# Patient Record
Sex: MALE | Race: OTHER | Employment: UNEMPLOYED | URBAN - METROPOLITAN AREA
[De-identification: names, ages, dates, MRNs, and addresses within clinical notes are randomized per-mention and may not be internally consistent; named-entity substitution may affect disease eponyms.]

---

## 2022-10-16 PROCEDURE — 96366 THER/PROPH/DIAG IV INF ADDON: CPT

## 2022-10-16 PROCEDURE — 96365 THER/PROPH/DIAG IV INF INIT: CPT

## 2022-10-16 PROCEDURE — 99285 EMERGENCY DEPT VISIT HI MDM: CPT

## 2022-10-16 PROCEDURE — 96368 THER/DIAG CONCURRENT INF: CPT

## 2022-10-16 PROCEDURE — 96376 TX/PRO/DX INJ SAME DRUG ADON: CPT

## 2022-10-16 PROCEDURE — 0J9J0ZZ DRAINAGE OF RIGHT HAND SUBCUTANEOUS TISSUE AND FASCIA, OPEN APPROACH: ICD-10-PCS | Performed by: ORTHOPAEDIC SURGERY

## 2022-10-16 RX ORDER — HYDROCODONE BITARTRATE AND ACETAMINOPHEN 7.5; 325 MG/1; MG/1
1 TABLET ORAL ONCE
Status: COMPLETED | OUTPATIENT
Start: 2022-10-17 | End: 2022-10-17

## 2022-10-17 ENCOUNTER — ANESTHESIA EVENT (OUTPATIENT)
Dept: SURGERY | Age: 22
DRG: 603 | End: 2022-10-17

## 2022-10-17 ENCOUNTER — ANESTHESIA (OUTPATIENT)
Dept: SURGERY | Age: 22
DRG: 603 | End: 2022-10-17

## 2022-10-17 ENCOUNTER — APPOINTMENT (OUTPATIENT)
Dept: GENERAL RADIOLOGY | Age: 22
DRG: 603 | End: 2022-10-17
Attending: STUDENT IN AN ORGANIZED HEALTH CARE EDUCATION/TRAINING PROGRAM

## 2022-10-17 ENCOUNTER — HOSPITAL ENCOUNTER (INPATIENT)
Age: 22
LOS: 1 days | Discharge: HOME | DRG: 603 | End: 2022-10-19
Attending: EMERGENCY MEDICINE | Admitting: HOSPITALIST

## 2022-10-17 DIAGNOSIS — M65.9 TENOSYNOVITIS: ICD-10-CM

## 2022-10-17 DIAGNOSIS — L03.011 CELLULITIS OF FINGER OF RIGHT HAND: Primary | ICD-10-CM

## 2022-10-17 LAB
ALBUMIN SERPL-MCNC: 3.8 G/DL (ref 3.5–5)
ALBUMIN/GLOB SERPL: 1 {RATIO} (ref 1.1–2.2)
ALP SERPL-CCNC: 61 U/L (ref 45–117)
ALT SERPL-CCNC: 17 U/L (ref 12–78)
ANION GAP SERPL CALC-SCNC: 5 MMOL/L (ref 5–15)
AST SERPL-CCNC: 3 U/L (ref 15–37)
BASOPHILS # BLD: 0 K/UL (ref 0–0.1)
BASOPHILS NFR BLD: 0 % (ref 0–1)
BILIRUB SERPL-MCNC: 0.5 MG/DL (ref 0.2–1)
BUN SERPL-MCNC: 17 MG/DL (ref 6–20)
BUN/CREAT SERPL: 21 (ref 12–20)
CALCIUM SERPL-MCNC: 9.2 MG/DL (ref 8.5–10.1)
CHLORIDE SERPL-SCNC: 103 MMOL/L (ref 97–108)
CO2 SERPL-SCNC: 28 MMOL/L (ref 21–32)
COMMENT, HOLDF: NORMAL
CREAT SERPL-MCNC: 0.82 MG/DL (ref 0.7–1.3)
DIFFERENTIAL METHOD BLD: ABNORMAL
EOSINOPHIL # BLD: 0.1 K/UL (ref 0–0.4)
EOSINOPHIL NFR BLD: 1 % (ref 0–7)
ERYTHROCYTE [DISTWIDTH] IN BLOOD BY AUTOMATED COUNT: 12.1 % (ref 11.5–14.5)
EST. AVERAGE GLUCOSE BLD GHB EST-MCNC: 105 MG/DL
GLOBULIN SER CALC-MCNC: 4 G/DL (ref 2–4)
GLUCOSE SERPL-MCNC: 119 MG/DL (ref 65–100)
HBA1C MFR BLD: 5.3 % (ref 4–5.6)
HCT VFR BLD AUTO: 41.3 % (ref 36.6–50.3)
HGB BLD-MCNC: 14.5 G/DL (ref 12.1–17)
IMM GRANULOCYTES # BLD AUTO: 0.1 K/UL (ref 0–0.04)
IMM GRANULOCYTES NFR BLD AUTO: 0 % (ref 0–0.5)
LACTATE BLD-SCNC: 1.04 MMOL/L (ref 0.4–2)
LYMPHOCYTES # BLD: 3 K/UL (ref 0.8–3.5)
LYMPHOCYTES NFR BLD: 20 % (ref 12–49)
MCH RBC QN AUTO: 31.5 PG (ref 26–34)
MCHC RBC AUTO-ENTMCNC: 35.1 G/DL (ref 30–36.5)
MCV RBC AUTO: 89.6 FL (ref 80–99)
MONOCYTES # BLD: 1.2 K/UL (ref 0–1)
MONOCYTES NFR BLD: 8 % (ref 5–13)
NEUTS SEG # BLD: 11 K/UL (ref 1.8–8)
NEUTS SEG NFR BLD: 71 % (ref 32–75)
NRBC # BLD: 0 K/UL (ref 0–0.01)
NRBC BLD-RTO: 0 PER 100 WBC
PLATELET # BLD AUTO: 345 K/UL (ref 150–400)
PMV BLD AUTO: 9.4 FL (ref 8.9–12.9)
POTASSIUM SERPL-SCNC: 4.2 MMOL/L (ref 3.5–5.1)
PROT SERPL-MCNC: 7.8 G/DL (ref 6.4–8.2)
RBC # BLD AUTO: 4.61 M/UL (ref 4.1–5.7)
SAMPLES BEING HELD,HOLD: NORMAL
SODIUM SERPL-SCNC: 136 MMOL/L (ref 136–145)
WBC # BLD AUTO: 15.5 K/UL (ref 4.1–11.1)

## 2022-10-17 PROCEDURE — 36415 COLL VENOUS BLD VENIPUNCTURE: CPT

## 2022-10-17 PROCEDURE — 83036 HEMOGLOBIN GLYCOSYLATED A1C: CPT

## 2022-10-17 PROCEDURE — 87205 SMEAR GRAM STAIN: CPT

## 2022-10-17 PROCEDURE — 85025 COMPLETE CBC W/AUTO DIFF WBC: CPT

## 2022-10-17 PROCEDURE — 96366 THER/PROPH/DIAG IV INF ADDON: CPT

## 2022-10-17 PROCEDURE — 73140 X-RAY EXAM OF FINGER(S): CPT

## 2022-10-17 PROCEDURE — 2709999900 HC NON-CHARGEABLE SUPPLY: Performed by: ORTHOPAEDIC SURGERY

## 2022-10-17 PROCEDURE — 74011000250 HC RX REV CODE- 250: Performed by: ORTHOPAEDIC SURGERY

## 2022-10-17 PROCEDURE — 77030020143 HC AIRWY LARYN INTUB CGAS -A: Performed by: ANESTHESIOLOGY

## 2022-10-17 PROCEDURE — 74011250637 HC RX REV CODE- 250/637: Performed by: STUDENT IN AN ORGANIZED HEALTH CARE EDUCATION/TRAINING PROGRAM

## 2022-10-17 PROCEDURE — 77030040361 HC SLV COMPR DVT MDII -B: Performed by: ORTHOPAEDIC SURGERY

## 2022-10-17 PROCEDURE — G0378 HOSPITAL OBSERVATION PER HR: HCPCS

## 2022-10-17 PROCEDURE — 76010000149 HC OR TIME 1 TO 1.5 HR: Performed by: ORTHOPAEDIC SURGERY

## 2022-10-17 PROCEDURE — 76210000006 HC OR PH I REC 0.5 TO 1 HR: Performed by: ORTHOPAEDIC SURGERY

## 2022-10-17 PROCEDURE — 96376 TX/PRO/DX INJ SAME DRUG ADON: CPT

## 2022-10-17 PROCEDURE — 26020 DRAIN HAND TENDON SHEATH: CPT | Performed by: ORTHOPAEDIC SURGERY

## 2022-10-17 PROCEDURE — 74011250636 HC RX REV CODE- 250/636: Performed by: NURSE ANESTHETIST, CERTIFIED REGISTERED

## 2022-10-17 PROCEDURE — 77030040503 HC DRN WND PENRS MDII -A: Performed by: ORTHOPAEDIC SURGERY

## 2022-10-17 PROCEDURE — 74011000250 HC RX REV CODE- 250: Performed by: STUDENT IN AN ORGANIZED HEALTH CARE EDUCATION/TRAINING PROGRAM

## 2022-10-17 PROCEDURE — 74011000250 HC RX REV CODE- 250: Performed by: PHYSICIAN ASSISTANT

## 2022-10-17 PROCEDURE — 74011000258 HC RX REV CODE- 258: Performed by: STUDENT IN AN ORGANIZED HEALTH CARE EDUCATION/TRAINING PROGRAM

## 2022-10-17 PROCEDURE — 87077 CULTURE AEROBIC IDENTIFY: CPT

## 2022-10-17 PROCEDURE — 96368 THER/DIAG CONCURRENT INF: CPT

## 2022-10-17 PROCEDURE — 83605 ASSAY OF LACTIC ACID: CPT

## 2022-10-17 PROCEDURE — 77030002986 HC SUT PROL J&J -A: Performed by: ORTHOPAEDIC SURGERY

## 2022-10-17 PROCEDURE — 96365 THER/PROPH/DIAG IV INF INIT: CPT

## 2022-10-17 PROCEDURE — 80053 COMPREHEN METABOLIC PANEL: CPT

## 2022-10-17 PROCEDURE — 74011000250 HC RX REV CODE- 250: Performed by: NURSE ANESTHETIST, CERTIFIED REGISTERED

## 2022-10-17 PROCEDURE — 87186 SC STD MICRODIL/AGAR DIL: CPT

## 2022-10-17 PROCEDURE — 87075 CULTR BACTERIA EXCEPT BLOOD: CPT

## 2022-10-17 PROCEDURE — 76060000033 HC ANESTHESIA 1 TO 1.5 HR: Performed by: ORTHOPAEDIC SURGERY

## 2022-10-17 PROCEDURE — 87147 CULTURE TYPE IMMUNOLOGIC: CPT

## 2022-10-17 PROCEDURE — 74011250636 HC RX REV CODE- 250/636: Performed by: ANESTHESIOLOGY

## 2022-10-17 PROCEDURE — 74011250636 HC RX REV CODE- 250/636: Performed by: STUDENT IN AN ORGANIZED HEALTH CARE EDUCATION/TRAINING PROGRAM

## 2022-10-17 RX ORDER — HYDROMORPHONE HYDROCHLORIDE 1 MG/ML
0.2 INJECTION, SOLUTION INTRAMUSCULAR; INTRAVENOUS; SUBCUTANEOUS
Status: DISCONTINUED | OUTPATIENT
Start: 2022-10-17 | End: 2022-10-17 | Stop reason: HOSPADM

## 2022-10-17 RX ORDER — DIPHENHYDRAMINE HYDROCHLORIDE 50 MG/ML
12.5 INJECTION, SOLUTION INTRAMUSCULAR; INTRAVENOUS AS NEEDED
Status: DISCONTINUED | OUTPATIENT
Start: 2022-10-17 | End: 2022-10-17 | Stop reason: HOSPADM

## 2022-10-17 RX ORDER — SODIUM CHLORIDE 9 MG/ML
25 INJECTION, SOLUTION INTRAVENOUS CONTINUOUS
Status: DISCONTINUED | OUTPATIENT
Start: 2022-10-17 | End: 2022-10-17 | Stop reason: HOSPADM

## 2022-10-17 RX ORDER — FENTANYL CITRATE 50 UG/ML
25 INJECTION, SOLUTION INTRAMUSCULAR; INTRAVENOUS
Status: DISCONTINUED | OUTPATIENT
Start: 2022-10-17 | End: 2022-10-17 | Stop reason: HOSPADM

## 2022-10-17 RX ORDER — ONDANSETRON 2 MG/ML
INJECTION INTRAMUSCULAR; INTRAVENOUS AS NEEDED
Status: DISCONTINUED | OUTPATIENT
Start: 2022-10-17 | End: 2022-10-17 | Stop reason: HOSPADM

## 2022-10-17 RX ORDER — MIDAZOLAM HYDROCHLORIDE 1 MG/ML
INJECTION, SOLUTION INTRAMUSCULAR; INTRAVENOUS AS NEEDED
Status: DISCONTINUED | OUTPATIENT
Start: 2022-10-17 | End: 2022-10-17 | Stop reason: HOSPADM

## 2022-10-17 RX ORDER — ACETAMINOPHEN 325 MG/1
650 TABLET ORAL ONCE
Status: DISCONTINUED | OUTPATIENT
Start: 2022-10-17 | End: 2022-10-17 | Stop reason: HOSPADM

## 2022-10-17 RX ORDER — LIDOCAINE HYDROCHLORIDE 10 MG/ML
10 INJECTION INFILTRATION; PERINEURAL ONCE
Status: DISPENSED | OUTPATIENT
Start: 2022-10-17 | End: 2022-10-18

## 2022-10-17 RX ORDER — ACETAMINOPHEN 500 MG
1000 TABLET ORAL EVERY 8 HOURS
Status: DISCONTINUED | OUTPATIENT
Start: 2022-10-17 | End: 2022-10-17

## 2022-10-17 RX ORDER — GLYCOPYRROLATE 0.2 MG/ML
0.2 INJECTION INTRAMUSCULAR; INTRAVENOUS
Status: DISCONTINUED | OUTPATIENT
Start: 2022-10-17 | End: 2022-10-17 | Stop reason: HOSPADM

## 2022-10-17 RX ORDER — ACETAMINOPHEN 325 MG/1
650 TABLET ORAL
Status: DISCONTINUED | OUTPATIENT
Start: 2022-10-17 | End: 2022-10-19 | Stop reason: HOSPADM

## 2022-10-17 RX ORDER — FENTANYL CITRATE 50 UG/ML
INJECTION, SOLUTION INTRAMUSCULAR; INTRAVENOUS AS NEEDED
Status: DISCONTINUED | OUTPATIENT
Start: 2022-10-17 | End: 2022-10-17 | Stop reason: HOSPADM

## 2022-10-17 RX ORDER — SODIUM CHLORIDE, SODIUM LACTATE, POTASSIUM CHLORIDE, CALCIUM CHLORIDE 600; 310; 30; 20 MG/100ML; MG/100ML; MG/100ML; MG/100ML
1000 INJECTION, SOLUTION INTRAVENOUS CONTINUOUS
Status: DISCONTINUED | OUTPATIENT
Start: 2022-10-17 | End: 2022-10-17 | Stop reason: HOSPADM

## 2022-10-17 RX ORDER — BUPIVACAINE HYDROCHLORIDE 5 MG/ML
5 INJECTION, SOLUTION EPIDURAL; INTRACAUDAL ONCE
Status: DISPENSED | OUTPATIENT
Start: 2022-10-17 | End: 2022-10-18

## 2022-10-17 RX ORDER — HYDROMORPHONE HYDROCHLORIDE 2 MG/ML
INJECTION, SOLUTION INTRAMUSCULAR; INTRAVENOUS; SUBCUTANEOUS AS NEEDED
Status: DISCONTINUED | OUTPATIENT
Start: 2022-10-17 | End: 2022-10-17 | Stop reason: HOSPADM

## 2022-10-17 RX ORDER — ACETAMINOPHEN 650 MG/1
650 SUPPOSITORY RECTAL EVERY 8 HOURS
Status: DISCONTINUED | OUTPATIENT
Start: 2022-10-17 | End: 2022-10-17

## 2022-10-17 RX ORDER — ONDANSETRON 2 MG/ML
4 INJECTION INTRAMUSCULAR; INTRAVENOUS
Status: DISCONTINUED | OUTPATIENT
Start: 2022-10-17 | End: 2022-10-19 | Stop reason: HOSPADM

## 2022-10-17 RX ORDER — ALBUTEROL SULFATE 0.83 MG/ML
2.5 SOLUTION RESPIRATORY (INHALATION) AS NEEDED
Status: DISCONTINUED | OUTPATIENT
Start: 2022-10-17 | End: 2022-10-17 | Stop reason: HOSPADM

## 2022-10-17 RX ORDER — SODIUM CHLORIDE 0.9 % (FLUSH) 0.9 %
5-40 SYRINGE (ML) INJECTION EVERY 8 HOURS
Status: DISCONTINUED | OUTPATIENT
Start: 2022-10-17 | End: 2022-10-19 | Stop reason: HOSPADM

## 2022-10-17 RX ORDER — ROPIVACAINE HYDROCHLORIDE 5 MG/ML
30 INJECTION, SOLUTION EPIDURAL; INFILTRATION; PERINEURAL AS NEEDED
Status: DISCONTINUED | OUTPATIENT
Start: 2022-10-17 | End: 2022-10-17 | Stop reason: HOSPADM

## 2022-10-17 RX ORDER — BUPIVACAINE HYDROCHLORIDE 2.5 MG/ML
INJECTION, SOLUTION EPIDURAL; INFILTRATION; INTRACAUDAL AS NEEDED
Status: DISCONTINUED | OUTPATIENT
Start: 2022-10-17 | End: 2022-10-17 | Stop reason: HOSPADM

## 2022-10-17 RX ORDER — MIDAZOLAM HYDROCHLORIDE 1 MG/ML
1 INJECTION, SOLUTION INTRAMUSCULAR; INTRAVENOUS AS NEEDED
Status: DISCONTINUED | OUTPATIENT
Start: 2022-10-17 | End: 2022-10-17 | Stop reason: HOSPADM

## 2022-10-17 RX ORDER — HYDROCODONE BITARTRATE AND ACETAMINOPHEN 5; 325 MG/1; MG/1
1 TABLET ORAL AS NEEDED
Status: DISCONTINUED | OUTPATIENT
Start: 2022-10-17 | End: 2022-10-17 | Stop reason: HOSPADM

## 2022-10-17 RX ORDER — ONDANSETRON 2 MG/ML
4 INJECTION INTRAMUSCULAR; INTRAVENOUS AS NEEDED
Status: DISCONTINUED | OUTPATIENT
Start: 2022-10-17 | End: 2022-10-17 | Stop reason: HOSPADM

## 2022-10-17 RX ORDER — ACETAMINOPHEN 325 MG/1
650 TABLET ORAL
Status: DISCONTINUED | OUTPATIENT
Start: 2022-10-17 | End: 2022-10-17

## 2022-10-17 RX ORDER — LIDOCAINE HYDROCHLORIDE 10 MG/ML
0.1 INJECTION, SOLUTION EPIDURAL; INFILTRATION; INTRACAUDAL; PERINEURAL AS NEEDED
Status: DISCONTINUED | OUTPATIENT
Start: 2022-10-17 | End: 2022-10-17 | Stop reason: HOSPADM

## 2022-10-17 RX ORDER — POLYETHYLENE GLYCOL 3350 17 G/17G
17 POWDER, FOR SOLUTION ORAL DAILY PRN
Status: DISCONTINUED | OUTPATIENT
Start: 2022-10-17 | End: 2022-10-19 | Stop reason: HOSPADM

## 2022-10-17 RX ORDER — FENTANYL CITRATE 50 UG/ML
50 INJECTION, SOLUTION INTRAMUSCULAR; INTRAVENOUS AS NEEDED
Status: DISCONTINUED | OUTPATIENT
Start: 2022-10-17 | End: 2022-10-17 | Stop reason: HOSPADM

## 2022-10-17 RX ORDER — MORPHINE SULFATE 2 MG/ML
2 INJECTION, SOLUTION INTRAMUSCULAR; INTRAVENOUS
Status: DISCONTINUED | OUTPATIENT
Start: 2022-10-17 | End: 2022-10-17 | Stop reason: HOSPADM

## 2022-10-17 RX ORDER — MIDAZOLAM HYDROCHLORIDE 1 MG/ML
0.5 INJECTION, SOLUTION INTRAMUSCULAR; INTRAVENOUS
Status: DISCONTINUED | OUTPATIENT
Start: 2022-10-17 | End: 2022-10-17 | Stop reason: HOSPADM

## 2022-10-17 RX ORDER — LIDOCAINE HYDROCHLORIDE 10 MG/ML
5 INJECTION INFILTRATION; PERINEURAL ONCE
Status: DISCONTINUED | OUTPATIENT
Start: 2022-10-17 | End: 2022-10-17 | Stop reason: CLARIF

## 2022-10-17 RX ORDER — ACETAMINOPHEN 650 MG/1
650 SUPPOSITORY RECTAL
Status: DISCONTINUED | OUTPATIENT
Start: 2022-10-17 | End: 2022-10-17

## 2022-10-17 RX ORDER — OXYCODONE HYDROCHLORIDE 5 MG/1
10 TABLET ORAL
Status: DISCONTINUED | OUTPATIENT
Start: 2022-10-17 | End: 2022-10-19 | Stop reason: HOSPADM

## 2022-10-17 RX ORDER — PROPOFOL 10 MG/ML
INJECTION, EMULSION INTRAVENOUS AS NEEDED
Status: DISCONTINUED | OUTPATIENT
Start: 2022-10-17 | End: 2022-10-17 | Stop reason: HOSPADM

## 2022-10-17 RX ORDER — SODIUM CHLORIDE, SODIUM LACTATE, POTASSIUM CHLORIDE, CALCIUM CHLORIDE 600; 310; 30; 20 MG/100ML; MG/100ML; MG/100ML; MG/100ML
INJECTION, SOLUTION INTRAVENOUS
Status: DISCONTINUED | OUTPATIENT
Start: 2022-10-17 | End: 2022-10-17 | Stop reason: HOSPADM

## 2022-10-17 RX ORDER — VANCOMYCIN/0.9 % SOD CHLORIDE 1.5G/250ML
1500 PLASTIC BAG, INJECTION (ML) INTRAVENOUS ONCE
Status: COMPLETED | OUTPATIENT
Start: 2022-10-17 | End: 2022-10-17

## 2022-10-17 RX ORDER — LIDOCAINE HYDROCHLORIDE 10 MG/ML
5 INJECTION, SOLUTION EPIDURAL; INFILTRATION; INTRACAUDAL; PERINEURAL ONCE
Status: COMPLETED | OUTPATIENT
Start: 2022-10-17 | End: 2022-10-17

## 2022-10-17 RX ORDER — DEXAMETHASONE SODIUM PHOSPHATE 4 MG/ML
INJECTION, SOLUTION INTRA-ARTICULAR; INTRALESIONAL; INTRAMUSCULAR; INTRAVENOUS; SOFT TISSUE AS NEEDED
Status: DISCONTINUED | OUTPATIENT
Start: 2022-10-17 | End: 2022-10-17 | Stop reason: HOSPADM

## 2022-10-17 RX ORDER — ONDANSETRON 4 MG/1
4 TABLET, ORALLY DISINTEGRATING ORAL
Status: DISCONTINUED | OUTPATIENT
Start: 2022-10-17 | End: 2022-10-19 | Stop reason: HOSPADM

## 2022-10-17 RX ORDER — LIDOCAINE HYDROCHLORIDE 20 MG/ML
INJECTION, SOLUTION EPIDURAL; INFILTRATION; INTRACAUDAL; PERINEURAL AS NEEDED
Status: DISCONTINUED | OUTPATIENT
Start: 2022-10-17 | End: 2022-10-17 | Stop reason: HOSPADM

## 2022-10-17 RX ORDER — SODIUM CHLORIDE 0.9 % (FLUSH) 0.9 %
5-40 SYRINGE (ML) INJECTION AS NEEDED
Status: DISCONTINUED | OUTPATIENT
Start: 2022-10-17 | End: 2022-10-19 | Stop reason: HOSPADM

## 2022-10-17 RX ORDER — SODIUM CHLORIDE 9 MG/ML
125 INJECTION, SOLUTION INTRAVENOUS CONTINUOUS
Status: DISCONTINUED | OUTPATIENT
Start: 2022-10-17 | End: 2022-10-18

## 2022-10-17 RX ADMIN — PIPERACILLIN AND TAZOBACTAM 3.38 G: 3; .375 INJECTION, POWDER, FOR SOLUTION INTRAVENOUS at 20:11

## 2022-10-17 RX ADMIN — ONDANSETRON 4 MG: 4 TABLET, ORALLY DISINTEGRATING ORAL at 03:06

## 2022-10-17 RX ADMIN — DEXAMETHASONE SODIUM PHOSPHATE 4 MG: 4 INJECTION, SOLUTION INTRAMUSCULAR; INTRAVENOUS at 17:45

## 2022-10-17 RX ADMIN — HYDROMORPHONE HYDROCHLORIDE 0.5 MG: 2 INJECTION, SOLUTION INTRAMUSCULAR; INTRAVENOUS; SUBCUTANEOUS at 18:08

## 2022-10-17 RX ADMIN — LIDOCAINE HYDROCHLORIDE 60 MG: 20 INJECTION, SOLUTION EPIDURAL; INFILTRATION; INTRACAUDAL; PERINEURAL at 17:38

## 2022-10-17 RX ADMIN — ONDANSETRON HYDROCHLORIDE 4 MG: 2 INJECTION, SOLUTION INTRAMUSCULAR; INTRAVENOUS at 18:29

## 2022-10-17 RX ADMIN — SODIUM CHLORIDE 1000 ML: 900 INJECTION, SOLUTION INTRAVENOUS at 02:11

## 2022-10-17 RX ADMIN — ACETAMINOPHEN 1000 MG: 500 TABLET ORAL at 08:11

## 2022-10-17 RX ADMIN — PIPERACILLIN AND TAZOBACTAM 3.38 G: 3; .375 INJECTION, POWDER, FOR SOLUTION INTRAVENOUS at 10:49

## 2022-10-17 RX ADMIN — SODIUM CHLORIDE, POTASSIUM CHLORIDE, SODIUM LACTATE AND CALCIUM CHLORIDE: 600; 310; 30; 20 INJECTION, SOLUTION INTRAVENOUS at 17:26

## 2022-10-17 RX ADMIN — PIPERACILLIN, TAZOBACTAM 4.5 G: 4; .5 INJECTION, POWDER, LYOPHILIZED, FOR SOLUTION INTRAVENOUS at 03:04

## 2022-10-17 RX ADMIN — SODIUM CHLORIDE, POTASSIUM CHLORIDE, SODIUM LACTATE AND CALCIUM CHLORIDE 1000 ML: 600; 310; 30; 20 INJECTION, SOLUTION INTRAVENOUS at 18:00

## 2022-10-17 RX ADMIN — FENTANYL CITRATE 25 MCG: 50 INJECTION, SOLUTION INTRAMUSCULAR; INTRAVENOUS at 17:43

## 2022-10-17 RX ADMIN — FENTANYL CITRATE 25 MCG: 50 INJECTION, SOLUTION INTRAMUSCULAR; INTRAVENOUS at 17:26

## 2022-10-17 RX ADMIN — FENTANYL CITRATE 25 MCG: 50 INJECTION, SOLUTION INTRAMUSCULAR; INTRAVENOUS at 17:49

## 2022-10-17 RX ADMIN — PROPOFOL 200 MG: 10 INJECTION, EMULSION INTRAVENOUS at 17:38

## 2022-10-17 RX ADMIN — SODIUM CHLORIDE, PRESERVATIVE FREE 10 ML: 5 INJECTION INTRAVENOUS at 15:25

## 2022-10-17 RX ADMIN — VANCOMYCIN HYDROCHLORIDE 1000 MG: 1 INJECTION, POWDER, LYOPHILIZED, FOR SOLUTION INTRAVENOUS at 15:30

## 2022-10-17 RX ADMIN — SODIUM CHLORIDE 125 ML/HR: 900 INJECTION, SOLUTION INTRAVENOUS at 03:04

## 2022-10-17 RX ADMIN — SODIUM CHLORIDE 125 ML/HR: 900 INJECTION, SOLUTION INTRAVENOUS at 19:15

## 2022-10-17 RX ADMIN — LIDOCAINE HYDROCHLORIDE 5 ML: 10 INJECTION, SOLUTION EPIDURAL; INFILTRATION; INTRACAUDAL; PERINEURAL at 10:25

## 2022-10-17 RX ADMIN — SODIUM CHLORIDE 125 ML/HR: 900 INJECTION, SOLUTION INTRAVENOUS at 10:47

## 2022-10-17 RX ADMIN — VANCOMYCIN HYDROCHLORIDE 1500 MG: 10 INJECTION, POWDER, LYOPHILIZED, FOR SOLUTION INTRAVENOUS at 02:10

## 2022-10-17 RX ADMIN — HYDROCODONE BITARTRATE AND ACETAMINOPHEN 1 TABLET: 7.5; 325 TABLET ORAL at 01:09

## 2022-10-17 RX ADMIN — MIDAZOLAM 2 MG: 1 INJECTION INTRAMUSCULAR; INTRAVENOUS at 17:26

## 2022-10-17 RX ADMIN — SODIUM CHLORIDE, PRESERVATIVE FREE 10 ML: 5 INJECTION INTRAVENOUS at 21:12

## 2022-10-17 RX ADMIN — FENTANYL CITRATE 25 MCG: 50 INJECTION, SOLUTION INTRAMUSCULAR; INTRAVENOUS at 18:00

## 2022-10-17 NOTE — ED TRIAGE NOTES
Patient arrives ambulatory from home with complaint of swollen, red, painful right index finger. Patient indicated he was at work and does not know what happened to it. Right finger has wound that has obvious injury/wound.   A&Ox4

## 2022-10-17 NOTE — ED NOTES
Bedside shift change report given to Natlaiya Valles (oncoming nurse) by Freddy Butler (offgoing nurse). Report included the following information SBAR.

## 2022-10-17 NOTE — CONSULTS
ORTHO CONSULT NOTE    Date of Consultation:  2022      HPI:  Mary Lombardo is a 19 y.o. male who c/o R index finger pain, swelling, drainage that has been increasing over the past week. Pt does not recall an injury to the finger. Pain is localized, severe, throbbing, worse with motion; no fever, chills, other finger or hand pain. Past Medical History:   Diagnosis Date    History of varicella age 4      No past surgical history on file. Family History   Problem Relation Age of Onset    Diabetes Mother       Social History     Tobacco Use    Smoking status: Former     Types: Cigarettes     Quit date: 2011     Years since quitting: 10.8    Smokeless tobacco: Not on file    Tobacco comments:     x2 a week   Substance Use Topics    Alcohol use: No     Alcohol/week: 0.0 standard drinks     No Known Allergies     Review of Systems:  Per HPI. Objective:     Patient Vitals for the past 8 hrs:   BP Temp Pulse Resp SpO2   10/17/22 0914 104/62 98.5 °F (36.9 °C) 68 20 98 %   10/17/22 0759 133/75 98.8 °F (37.1 °C) 68 20 98 %     Temp (24hrs), Av.5 °F (36.9 °C), Min:98.1 °F (36.7 °C), Max:98.8 °F (37.1 °C)      EXAM:   R Index finger with circumferential erythema, ttp, fluctuance most notably on the ulnar aspect. Has minimal flexion with AROM, Passive ROM of DIP with some pain, no increase in pain with passive extension. CR brisk, SILT ulnar and radial side of finger. Remainder of hand is wnl. Imaging Review:   Results from Hospital Encounter encounter on 10/17/22    XR 2ND FINGER RT MIN 2 V    Narrative  EXAM: XR 2ND FINGER RT MIN 2 V    INDICATION: R second finger infection, r/o osteomyelitis. COMPARISON: None. FINDINGS: Three views of the right second finger demonstrate no fracture or  other acute osseous or articular abnormality. Soft tissue swelling of the second  digit is noted. Impression  Soft tissue swelling second digit without acute osseous abnormality.     No results found for this or any previous visit. Labs:   Recent Results (from the past 24 hour(s))   CBC WITH AUTOMATED DIFF    Collection Time: 10/17/22 12:50 AM   Result Value Ref Range    WBC 15.5 (H) 4.1 - 11.1 K/uL    RBC 4.61 4.10 - 5.70 M/uL    HGB 14.5 12.1 - 17.0 g/dL    HCT 41.3 36.6 - 50.3 %    MCV 89.6 80.0 - 99.0 FL    MCH 31.5 26.0 - 34.0 PG    MCHC 35.1 30.0 - 36.5 g/dL    RDW 12.1 11.5 - 14.5 %    PLATELET 002 415 - 767 K/uL    MPV 9.4 8.9 - 12.9 FL    NRBC 0.0 0  WBC    ABSOLUTE NRBC 0.00 0.00 - 0.01 K/uL    NEUTROPHILS 71 32 - 75 %    LYMPHOCYTES 20 12 - 49 %    MONOCYTES 8 5 - 13 %    EOSINOPHILS 1 0 - 7 %    BASOPHILS 0 0 - 1 %    IMMATURE GRANULOCYTES 0 0.0 - 0.5 %    ABS. NEUTROPHILS 11.0 (H) 1.8 - 8.0 K/UL    ABS. LYMPHOCYTES 3.0 0.8 - 3.5 K/UL    ABS. MONOCYTES 1.2 (H) 0.0 - 1.0 K/UL    ABS. EOSINOPHILS 0.1 0.0 - 0.4 K/UL    ABS. BASOPHILS 0.0 0.0 - 0.1 K/UL    ABS. IMM. GRANS. 0.1 (H) 0.00 - 0.04 K/UL    DF AUTOMATED     METABOLIC PANEL, COMPREHENSIVE    Collection Time: 10/17/22 12:50 AM   Result Value Ref Range    Sodium 136 136 - 145 mmol/L    Potassium 4.2 3.5 - 5.1 mmol/L    Chloride 103 97 - 108 mmol/L    CO2 28 21 - 32 mmol/L    Anion gap 5 5 - 15 mmol/L    Glucose 119 (H) 65 - 100 mg/dL    BUN 17 6 - 20 MG/DL    Creatinine 0.82 0.70 - 1.30 MG/DL    BUN/Creatinine ratio 21 (H) 12 - 20      eGFR >60 >60 ml/min/1.73m2    Calcium 9.2 8.5 - 10.1 MG/DL    Bilirubin, total 0.5 0.2 - 1.0 MG/DL    ALT (SGPT) 17 12 - 78 U/L    AST (SGOT) 3 (L) 15 - 37 U/L    Alk.  phosphatase 61 45 - 117 U/L    Protein, total 7.8 6.4 - 8.2 g/dL    Albumin 3.8 3.5 - 5.0 g/dL    Globulin 4.0 2.0 - 4.0 g/dL    A-G Ratio 1.0 (L) 1.1 - 2.2     SAMPLES BEING HELD    Collection Time: 10/17/22 12:50 AM   Result Value Ref Range    SAMPLES BEING HELD 1RED,1BLUE     COMMENT        Add-on orders for these samples will be processed based on acceptable specimen integrity and analyte stability, which may vary by analyte. HEMOGLOBIN A1C WITH EAG    Collection Time: 10/17/22 12:50 AM   Result Value Ref Range    Hemoglobin A1c 5.3 4.0 - 5.6 %    Est. average glucose 105 mg/dL   POC LACTIC ACID    Collection Time: 10/17/22 12:55 AM   Result Value Ref Range    Lactic Acid (POC) 1.04 0.40 - 2.00 mmol/L   CULTURE, WOUND W GRAM STAIN    Collection Time: 10/17/22  1:41 AM    Specimen: Hand; Wound   Result Value Ref Range    Special Requests: INDEX FINGER RT HAND     GRAM STAIN OCCASIONAL WBCS SEEN      GRAM STAIN OCCASIONAL GRAM POSITIVE COCCI CLUSTERS      Culture result: PENDING        Impression:     Patient Active Problem List    Diagnosis Date Noted    Cellulitis of finger of right hand 10/17/2022     Active Problems:    Cellulitis of finger of right hand (10/17/2022)        Plan:   - I&D bedside - minimal pus, will likely require more extensive debridement. Continue IV Abx. NPO after midnight for likely OR tomorrow. We will see how things progress over night. - Keep incision wrapped, changed as needed for saturation with sterile dressing. Elevate, Warm Soaks. Dr. Yoni Blanco is aware and agrees with above plan.       JOSY Lujan  Orthopedic Trauma Service  [facility]

## 2022-10-17 NOTE — ANESTHESIA PREPROCEDURE EVALUATION
Relevant Problems   No relevant active problems       Anesthetic History   No history of anesthetic complications            Review of Systems / Medical History  Patient summary reviewed, nursing notes reviewed and pertinent labs reviewed    Pulmonary  Within defined limits                 Neuro/Psych   Within defined limits           Cardiovascular  Within defined limits                Exercise tolerance: >4 METS     GI/Hepatic/Renal  Within defined limits              Endo/Other  Within defined limits           Other Findings   Comments: Finger infection           Physical Exam    Airway  Mallampati: II  TM Distance: 4 - 6 cm  Neck ROM: normal range of motion   Mouth opening: Normal     Cardiovascular    Rhythm: regular  Rate: normal         Dental  No notable dental hx       Pulmonary  Breath sounds clear to auscultation               Abdominal  Abdominal exam normal       Other Findings            Anesthetic Plan    ASA: 1  Anesthesia type: general          Induction: Intravenous  Anesthetic plan and risks discussed with: Patient

## 2022-10-17 NOTE — OP NOTES
Operative Report    Patient: Dee Loyd MRN: [id number]  SSN: [id number]    YOB: 2000  Age: 19 y.o. Sex: male       Date of Surgery: 10/17/2022     Preoperative Diagnosis: Right index finger infection    Postoperative Diagnosis: RIGHT INDEX FINGER subcutaneous infection extending into the palm bursa    Surgeon(s) and Role:     * Junior Echevarria MD - Primary    Anesthesia: General     Procedure: Procedure(s):  INCISION AND DRAINAGE RIGHT HAND complex subcutaneous abscess extending into the tendon sheath of the digit and palm    Procedure in Detail: Patient was identified preoperatively and the operative site was marked. We again reviewed risks, benefits and alternatives and the patient elected proceed with operative intervention. We discussed risks, benefits and alternatives to surgery. After thorough discussion ultimately the patient elected to proceed with operative intervention. We discussed the risks including but not limited to postoperative pain, swelling, bruising, bleeding, scarring, infection, damage to neurovascular structures. We also discussed permanent or temporary loss of range of motion, need for additional surgery, rejection of foreign material such as metal, suture or other tissue. We discussed risk of blood clots. The patient was brought back to the operative suite placed supine on the operative table with all bony prominences well-padded. The operative extremity was prepped and draped in standard sterile fashion. Timeout was performed confirming correct patient procedure site and laterality, all were in agreement. Extremity was elevated and the tourniquet was insufflated to 250 mmHg. I made an incision over the A1 pulley and carefully dissected through skin and subcutaneous tissue. There was significant purulence I encountered even before encountering the A1 pulley. Cultures were obtained.   I then debulked this area of purulence using suction. I then carefully continued my dissection down to identify the A1 pulley and incised it sharply. Minimal purulence was encountered after releasing the A1 pulley. Again I debulked this. The tissue over P1 was significantly macerated and there was some superficial skin loss. This appeared to be as result of the chronic drainage. I continued my incision distally over P1 and further release additional purulence. I was able to elevate the soft tissue over P2 and avoid incision in that area. There did not appear to be any tracking dorsally. I then thoroughly irrigated the wound with copious normal saline. I reexamined the wound and there is no further purulence is unable to express any both proximally or distally. I then irrigated again with copious normal saline. I then used 4-0 Prolene suture to approximate the skin edges. Again there was some skin loss over P1 and I rotated part of my skin flap in order to cover that area. I used a hemostat to deliver a Penrose drain through the wound. To allow ongoing drainage. I then cleaned the hand and placed Xeroform followed by 4 x 4's, Webril and a volar splint the tourniquet was let down and digits pinked up readily. Patient was transferred back in stable condition. Estimated Blood Loss:  10 cc    Tourniquet Time:   Total Tourniquet Time Documented:  Upper Arm (Left) - 27 minutes  Total: Upper Arm (Left) - 27 minutes        Implants: * No implants in log *            Specimens:   ID Type Source Tests Collected by Time Destination   1 : RIGHT INDEX FINGER CULTURE Abscess Finger ANAEROBIC/AEROBIC/GRAM STAIN, CULTURE, ANAEROBIC AND AEROBIC Conan Lesch, MD 10/17/2022 1807 Microbiology           Drains: None                Complications: None    Counts: Sponge and needle counts were correct times two.     Signed By:  Sharyle Favorite, MD     October 17, 2022

## 2022-10-17 NOTE — H&P
History & Physical    Primary Care Provider: None  Source of Information: Patient and chart review    History of Presenting Illness:   Asif Stovall is a 19 y.o. male without significant pmh who presents to ed with complaints of pain, swelling, redness and purulent drainage from his right index finger. Noted mild symptoms about 1 week ago which has progressively worsened. The patient denies any fever, chills, chest or abdominal pain, nausea, vomiting, cough, congestion, recent illness, palpitations, or dysuria. Remarkable vitals on ER Presentations: bp 155/90  Labs Remarkable for: wbc 15.5,   ER Images: xr right index finger: Soft tissue swelling second digit without acute osseous abnormality. ER Rx: none     Review of Systems:  Pertinent items are noted in the History of Present Illness. Past Medical History:   Diagnosis Date    History of varicella age 4      No past surgical history on file. Prior to Admission medications    Not on File     No Known Allergies   Family History   Problem Relation Age of Onset    Diabetes Mother         SOCIAL HISTORY:  Patient resides:  Independently x   Assisted Living    SNF    With family care       Smoking history:   None x   Former    Chronic      Alcohol history:   None x   Social    Chronic      Ambulates:   Independently xx   w/cane    w/walker    w/wc    CODE STATUS:  DNR    Full    Other      Objective:     Physical Exam:     Visit Vitals  BP (!) 155/90 (BP 1 Location: Left upper arm, BP Patient Position: Sitting)   Pulse 72   Temp 98.1 °F (36.7 °C)   Resp 18   SpO2 98%      O2 Device: None (Room air)    General:  Alert, cooperative, no distress, appears stated age. Head:  Normocephalic, without obvious abnormality, atraumatic. Eyes:  Conjunctivae/corneas clear. PERRL, EOMs intact. Nose: Nares normal. Septum midline. Mucosa normal.        Neck: Supple, symmetrical, trachea midline.        Lungs:   Clear to auscultation bilaterally. Chest wall:  No tenderness or deformity. Heart:  Regular rate and rhythm, S1, S2 normal   Abdomen:   Soft, non-tender. Bowel sounds normal. No masses,  No organomegaly. Extremities: Extremities normal, atraumatic, no cyanosis or edema. Pulses: 2+ and symmetric all extremities. Skin: Skin color, texture, turgor normal. No rashes or lesions   Neurologic: CNII-XII intact. Data Review:     Recent Days:  Recent Labs     10/17/22  0050   WBC 15.5*   HGB 14.5   HCT 41.3        No results for input(s): NA, K, CL, CO2, GLU, BUN, CREA, CA, MG, PHOS, ALB, TBIL, ALT, INR, INREXT in the last 72 hours. No lab exists for component: SGOT  No results for input(s): PH, PCO2, PO2, HCO3, FIO2 in the last 72 hours. 24 Hour Results:  Recent Results (from the past 24 hour(s))   CBC WITH AUTOMATED DIFF    Collection Time: 10/17/22 12:50 AM   Result Value Ref Range    WBC 15.5 (H) 4.1 - 11.1 K/uL    RBC 4.61 4.10 - 5.70 M/uL    HGB 14.5 12.1 - 17.0 g/dL    HCT 41.3 36.6 - 50.3 %    MCV 89.6 80.0 - 99.0 FL    MCH 31.5 26.0 - 34.0 PG    MCHC 35.1 30.0 - 36.5 g/dL    RDW 12.1 11.5 - 14.5 %    PLATELET 251 645 - 505 K/uL    MPV 9.4 8.9 - 12.9 FL    NRBC 0.0 0  WBC    ABSOLUTE NRBC 0.00 0.00 - 0.01 K/uL    NEUTROPHILS 71 32 - 75 %    LYMPHOCYTES 20 12 - 49 %    MONOCYTES 8 5 - 13 %    EOSINOPHILS 1 0 - 7 %    BASOPHILS 0 0 - 1 %    IMMATURE GRANULOCYTES 0 0.0 - 0.5 %    ABS. NEUTROPHILS 11.0 (H) 1.8 - 8.0 K/UL    ABS. LYMPHOCYTES 3.0 0.8 - 3.5 K/UL    ABS. MONOCYTES 1.2 (H) 0.0 - 1.0 K/UL    ABS. EOSINOPHILS 0.1 0.0 - 0.4 K/UL    ABS. BASOPHILS 0.0 0.0 - 0.1 K/UL    ABS. IMM.  GRANS. 0.1 (H) 0.00 - 0.04 K/UL    DF AUTOMATED     SAMPLES BEING HELD    Collection Time: 10/17/22 12:50 AM   Result Value Ref Range    SAMPLES BEING HELD 1RED,1BLUE     COMMENT        Add-on orders for these samples will be processed based on acceptable specimen integrity and analyte stability, which may vary by analyte. POC LACTIC ACID    Collection Time: 10/17/22 12:55 AM   Result Value Ref Range    Lactic Acid (POC) 1.04 0.40 - 2.00 mmol/L         Imaging:     Assessment:     Nuzhat Morocho is a 19 y.o. male without significant pmh who is admitted for cellulitis and abscess of right index finger.        Plan:       Cellulitis and Abscess of Right Index Finger  -cont of vanc and zosyn  -follow wound and blood cultures  -tylenol and orlin for pain  -will need I&D   -ortho on consult    Leukocytosis  -follow blood cultures and trend cbc  -abx as above        FEN/GI -  [email or web address]  Activity - as tolerated  DVT prophylaxis - scds  GI prophylaxis -  NI  Disposition - Home    CODE STATUS:  full code       Signed By: Ana Palmer MD     October 17, 2022

## 2022-10-17 NOTE — PROGRESS NOTES
Pharmacist Note - Vancomycin Dosing    Consult provided for this 19 y.o. male for indication of SSTI, Right 2nd digit. Antibiotic regimen(s): Vancomycin and Zosyn  Patient on vancomycin PTA? NO     Recent Labs     10/17/22  0050   WBC 15.5*   CREA 0.82   BUN 17     Frequency of BMP: Daily x 3  Height: 167.64 cm  Weight: 59 kg  Est CrCl: 116 ml/min  Temp (24hrs), Av.5 °F (36.9 °C), Min:98.1 °F (36.7 °C), Max:98.8 °F (37.1 °C)    Cultures:  10/17 Wound, R 2nd finger - occ. GPC in clusters - pending    The plan below is expected to result in a target range of AUC/PAVEL 400-500    Therapy will be initiated with a loading dose of 1500 mg IV x 1 to be followed by a maintenance dose of 1000 mg IV every 12 hours. Pharmacy to follow patient daily and order levels / make dose adjustments as appropriate. Thony Rasmussen, PharmD  Clinical Pharmacist  [facility] ()    ------  *Vancomycin has been dosed used Bayesian kinetics software to target an AUC/PAVEL of 400-600, which provides adequate exposure for an assumed infection due to MRSA with an PAVEL of 1 or less while reducing the risk of nephrotoxicity as seen with traditional trough based dosing goals.

## 2022-10-17 NOTE — ED PROVIDER NOTES
25-year-old male presents to ED with 1 day of right finger pain and infection. Patient reports that he noticed this 1 week ago and has worsened since then with R index finger redness, pain, and swelling. Patient believes that he got a cut while at work and this has caused him to get an infection. He has not seen any other medical provider for these concerns as he was initially was trying to take tetracycline which he had bought OTC at a store. He reports that he tried to drain this today but was only able to get blood out. He also notes subjective fevers but has not measured his temperature. He denies any history of similar symptoms. Denies any CP, SOB, numbness, tingling. The history is provided by the patient. Finger Pain        Past Medical History:   Diagnosis Date    History of varicella age 4       No past surgical history on file.       Family History:   Problem Relation Age of Onset    Diabetes Mother        Social History     Socioeconomic History    Marital status: SINGLE     Spouse name: Not on file    Number of children: Not on file    Years of education: Not on file    Highest education level: Not on file   Occupational History    Not on file   Tobacco Use    Smoking status: Former     Types: Cigarettes     Quit date: 12/17/2011     Years since quitting: 10.8    Smokeless tobacco: Not on file    Tobacco comments:     x2 a week   Substance and Sexual Activity    Alcohol use: No     Alcohol/week: 0.0 standard drinks    Drug use: No    Sexual activity: Not on file   Other Topics Concern    Not on file   Social History Narrative    Not on file     Social Determinants of Health     Financial Resource Strain: Not on file   Food Insecurity: Not on file   Transportation Needs: Not on file   Physical Activity: Not on file   Stress: Not on file   Social Connections: Not on file   Intimate Partner Violence: Not on file   Housing Stability: Not on file         ALLERGIES: Patient has no known allergies. Review of Systems   Constitutional:  Negative for fever. HENT:  Negative for congestion and sinus pain. Respiratory:  Negative for cough. Cardiovascular:  Negative for chest pain. Gastrointestinal:  Negative for nausea and vomiting. Genitourinary:  Negative for dysuria. Musculoskeletal:  Positive for joint swelling. Negative for myalgias. Skin:  Positive for color change. Neurological:  Negative for headaches. Hematological:  Negative for adenopathy. All other systems reviewed and are negative. Vitals:    10/16/22 2330   BP: (!) 155/90   Pulse: 72   Resp: 18   Temp: 98.1 °F (36.7 °C)   SpO2: 98%            Physical Exam  Vitals and nursing note reviewed. Constitutional:       Appearance: Normal appearance. Eyes:      Extraocular Movements: Extraocular movements intact. Pupils: Pupils are equal, round, and reactive to light. Cardiovascular:      Rate and Rhythm: Normal rate and regular rhythm. Heart sounds: Normal heart sounds. Pulmonary:      Effort: Pulmonary effort is normal.      Breath sounds: Normal breath sounds. Abdominal:      Palpations: Abdomen is soft. Tenderness: There is no abdominal tenderness. Musculoskeletal:      Right hand: Swelling (2nd digit diffusely) and tenderness (2nd digit) present. Decreased range of motion (2nd digit flexion). Comments: See pictures below   Lymphadenopathy:      Cervical: No cervical adenopathy. Skin:     General: Skin is warm and dry. Findings: Erythema (to second digit with associated swelling and streaking up R palm) present. Comments: See pictures below   Neurological:      General: No focal deficit present. Mental Status: He is alert and oriented to person, place, and time.    Psychiatric:         Mood and Affect: Mood normal.         Behavior: Behavior normal.                  MDM  Number of Diagnoses or Management Options  Cellulitis of finger of right hand  Diagnosis management comments: 23-year-old male with no significant past medical history presents to ED with 1 week of right finger pain and infection. Vital signs stable in triage with patient afebrile. Physical exam notable for right second finger redness with associated diffuse swelling, streaking up right palm, reduced range of motion and tenderness to palpation. Labs notable for elevated white blood cell count of 15. 5. X-ray of right finger showed soft tissue swelling without acute osseous abnormality. Patient received [place] while in ED with improvement of pain. Consulted with Ortho as outlined below who recommended admission for likely I&D tomorrow. Ordered IV antibiotics and will admit to medicine. Amount and/or Complexity of Data Reviewed  Tests in the radiology section of CPT®: reviewed      ED Course as of 10/17/22 0111   Mon Oct 17, 2022   0109 Consulted with ortho who recommended admission for likely I&D tomorrow, patient will be admitted to medicine and made NPO [AH]      ED Course User Index  [AH] JOSY Camarena       Procedures      [facility] Consult for Orthopedic Surgery  12:59 AM    ED Room Number: R38/R38  Patient Name and age:  Margareth Edwards 19 y.o.  male  Working Diagnosis:   1. Cellulitis of finger of right hand      Department: [facility] Adult ED - [phone]    Other: 23-year-old right-handed dominant male presents to ED with 1 week of right finger pain and infection. Patient reports he is unsure how he initially sustained a wound to his right index finger but over the week has had worsening swelling, redness and pain to this finger. It has gotten to the point where he has severe reduced range of motion and physical exam reveals erythematous streaking up right hand. X-ray did not reveal any evidence of osteomyelitis but likely will be admitting to medicine for IV antibiotics, would you all be able to see in AM? Pictures to follow.         [facility] Consult for Admission  1:11 AM    ED Room Number: R38/R38  Patient Name and age:  Magdalena Carlson 19 y.o.  male  Working Diagnosis:   1. Cellulitis of finger of right hand        COVID-19 Suspicion:  no  Sepsis present:  no  Reassessment needed: yes  Code Status:  Full Code  Readmission: no  Isolation Requirements:  no  Recommended Level of Care:  med/surg  Department:[facility] Adult ED - 19   Other: 13-year-old right-hand-dominant male with no significant medical history presents to ED with 1 week of right finger pain and infection. Patient reports he is unsure how he initially sustained a burn to his right index finger but over the week he has had worsening swelling, redness and pain to this finger. It is gotten to the point where he has severe reduced range of motion and physical exam reveals erythematous streaking up right hand. White blood cell count elevated at 15.5. Consulted with Ortho who recommended admission for IV antibiotics, Ortho to see in the morning and likely will do I&D tomorrow. Vancomycin and cefepime ordered.

## 2022-10-17 NOTE — ED NOTES
Has a final transfer review been performed? Yes    Reason for Admission: Cellulitis of left finger  Patient comes from: Home  Mental Status: Alert and oriented  ADL:self care  Ambulation:no difficulty  Pertinent Info/Safety Concerns: Vancomycin is due after Zosyn is finished.  Possible surgery tomorrow  COVID Status: Not Indicated  MEWS Score: 1     Vitals:    10/17/22 0143 10/17/22 0759 10/17/22 0914 10/17/22 1441   BP:  133/75 104/62 (!) 150/80   Pulse:  68 68 65   Resp:  20 20 20   Temp:  98.8 °F (37.1 °C) 98.5 °F (36.9 °C) 99.1 °F (37.3 °C)   SpO2:  98% 98% 98%   Weight: 59 kg (130 lb 1.1 oz)  70 kg (154 lb 5.2 oz)    Height:   5' 6\" (1.676 m)      Lines:   Peripheral IV 10/17/22 Left Antecubital (Active)   Site Assessment Clean, dry, & intact 10/17/22 0057   Phlebitis Assessment 0 10/17/22 0057   Infiltration Assessment 0 10/17/22 0057   Dressing Status Clean, dry, & intact 10/17/22 0057   Hub Color/Line Status Pink 10/17/22 0057      Transport mode:Wheelchair    Carlos Davis Morning  being transferred to [facility](unit) for routine progression of care     \"Notification of etransfer note given to (Name) [address] (Title) [address] [facility] [facility]

## 2022-10-17 NOTE — ANESTHESIA POSTPROCEDURE EVALUATION
Post-Anesthesia Evaluation and Assessment    Patient: Shreya Maldonado MRN: [id number]  SSN: [id number]    YOB: 2000  Age: 19 y.o. Sex: male      I have evaluated the patient and they are stable and ready for discharge from the PACU. Cardiovascular Function/Vital Signs  Visit Vitals  /71   Pulse 66   Temp 36.7 °C (98.1 °F)   Resp 13   Ht 5' 6\" (1.676 m)   Wt 70 kg (154 lb 5.2 oz)   SpO2 100%   BMI 24.91 kg/m²       Patient is status post General anesthesia for Procedure(s):  INCISION AND DRAINAGE RIGHT HAND. Nausea/Vomiting: None    Postoperative hydration reviewed and adequate. Pain:  Pain Scale 1: Numeric (0 - 10) (10/17/22 1521)  Pain Intensity 1: 0 (10/17/22 1521)   Managed    Neurological Status:   Neuro (WDL): Within Defined Limits (10/17/22 1842)   At baseline    Mental Status, Level of Consciousness: Alert and  oriented to person, place, and time    Pulmonary Status:   O2 Device: Nasal cannula (10/17/22 1843)   Adequate oxygenation and airway patent    Complications related to anesthesia: None    Post-anesthesia assessment completed. No concerns    Signed By: Rosemarie Lombardo MD     October 17, 2022              Procedure(s):  INCISION AND DRAINAGE RIGHT HAND. general    <BSHSIANPOST>    INITIAL Post-op Vital signs:   Vitals Value Taken Time   /63 10/17/22 1900   Temp 36.7 °C (98.1 °F) 10/17/22 1843   Pulse 65 10/17/22 1911   Resp 13 10/17/22 1911   SpO2 99 % 10/17/22 1911   Vitals shown include unvalidated device data.

## 2022-10-17 NOTE — PERIOP NOTES
TRANSFER - OUT REPORT:    Verbal report given to Olivia (name) on [address]  being transferred to [id number] (unit) for routine post - op       Report consisted of patients Situation, Background, Assessment and   Recommendations(SBAR). Time Pre op antibiotic given:1049 & 6668  Anesthesia Stop time: 1844    Information from the following report(s) SBAR, OR Summary, Procedure Summary, Intake/Output, and MAR was reviewed with the receiving nurse. Opportunity for questions and clarification was provided. Is the patient on 02? NO       L/Min RA       Other N/A    Is the patient on a monitor? NO    Is the nurse transporting with the patient? NO    Surgical Waiting Area notified of patient's transfer from PACU? NO family in the waiting area. Attempted to call sister Donna Rivera [phone]. A man answered the phone & hang up.       The following personal items collected during your admission accompanied patient upon transfer:   Dental Appliance: Dental Appliances: None  Vision:    Hearing Aid:    Jewelry:    Clothing:   w/ pt to 622  Other Valuables:    Valuables sent to safe:

## 2022-10-17 NOTE — PROGRESS NOTES
TRANSFER - IN REPORT:    Verbal report received from SIMONRN(name) on [address]  being received from 6[address](unit) for ordered procedure      Report consisted of patients Situation, Background, Assessment and   Recommendations(SBAR). Information from the following report(s) SBAR, Kardex, ED Summary, Intake/Output, MAR, Recent Results, and Pre Procedure Checklist was reviewed with the receiving nurse. Opportunity for questions and clarification was provided. Assessment completed upon patients arrival to unit and care assumed.

## 2022-10-17 NOTE — BRIEF OP NOTE
Brief Postoperative Note    Patient: Javier Carlson  YOB: 2000  MRN: [id number]    Date of Procedure: 10/17/2022     Pre-Op Diagnosis: Right index finger infection    Post-Op Diagnosis: Right index finger complex cutaneous infection extending proximally into the palmar bursa, pyogenic tenosynovitis    Procedure(s):  INCISION AND DRAINAGE RIGHT HAND    Surgeon(s):  Rob Bravo MD    Surgical Assistant: None    Anesthesia: General     Estimated Blood Loss (mL): Minimal    Complications: None    Specimens:   ID Type Source Tests Collected by Time Destination   1 : RIGHT INDEX FINGER CULTURE Abscess Finger ANAEROBIC/AEROBIC/GRAM STAIN, CULTURE, ANAEROBIC AND AEROBIC Rob Bravo MD 10/17/2022 1807 Microbiology        Implants: * No implants in log *    Drains:   Penrose Drain 10/17/22 Left Hand (Active)       Findings: As above    Electronically Signed by Nnia Griffin MD on 10/17/2022 at 6:40 PM

## 2022-10-18 PROBLEM — M65.9 TENOSYNOVITIS: Status: ACTIVE | Noted: 2022-10-18

## 2022-10-18 LAB
ANION GAP SERPL CALC-SCNC: 9 MMOL/L (ref 5–15)
BASOPHILS # BLD: 0 K/UL (ref 0–0.1)
BASOPHILS NFR BLD: 0 % (ref 0–1)
BUN SERPL-MCNC: 8 MG/DL (ref 6–20)
BUN/CREAT SERPL: 12 (ref 12–20)
CALCIUM SERPL-MCNC: 8.9 MG/DL (ref 8.5–10.1)
CHLORIDE SERPL-SCNC: 105 MMOL/L (ref 97–108)
CO2 SERPL-SCNC: 23 MMOL/L (ref 21–32)
CREAT SERPL-MCNC: 0.65 MG/DL (ref 0.7–1.3)
DIFFERENTIAL METHOD BLD: ABNORMAL
EOSINOPHIL # BLD: 0 K/UL (ref 0–0.4)
EOSINOPHIL NFR BLD: 0 % (ref 0–7)
ERYTHROCYTE [DISTWIDTH] IN BLOOD BY AUTOMATED COUNT: 11.8 % (ref 11.5–14.5)
GLUCOSE SERPL-MCNC: 111 MG/DL (ref 65–100)
HCT VFR BLD AUTO: 36.7 % (ref 36.6–50.3)
HGB BLD-MCNC: 12.8 G/DL (ref 12.1–17)
IMM GRANULOCYTES # BLD AUTO: 0 K/UL (ref 0–0.04)
IMM GRANULOCYTES NFR BLD AUTO: 0 % (ref 0–0.5)
LYMPHOCYTES # BLD: 1.7 K/UL (ref 0.8–3.5)
LYMPHOCYTES NFR BLD: 12 % (ref 12–49)
MCH RBC QN AUTO: 30.9 PG (ref 26–34)
MCHC RBC AUTO-ENTMCNC: 34.9 G/DL (ref 30–36.5)
MCV RBC AUTO: 88.6 FL (ref 80–99)
MONOCYTES # BLD: 0.5 K/UL (ref 0–1)
MONOCYTES NFR BLD: 3 % (ref 5–13)
NEUTS SEG # BLD: 12.2 K/UL (ref 1.8–8)
NEUTS SEG NFR BLD: 85 % (ref 32–75)
NRBC # BLD: 0 K/UL (ref 0–0.01)
NRBC BLD-RTO: 0 PER 100 WBC
PLATELET # BLD AUTO: 312 K/UL (ref 150–400)
PMV BLD AUTO: 9.6 FL (ref 8.9–12.9)
POTASSIUM SERPL-SCNC: 4 MMOL/L (ref 3.5–5.1)
RBC # BLD AUTO: 4.14 M/UL (ref 4.1–5.7)
SODIUM SERPL-SCNC: 137 MMOL/L (ref 136–145)
WBC # BLD AUTO: 14.4 K/UL (ref 4.1–11.1)

## 2022-10-18 PROCEDURE — 85025 COMPLETE CBC W/AUTO DIFF WBC: CPT

## 2022-10-18 PROCEDURE — 36415 COLL VENOUS BLD VENIPUNCTURE: CPT

## 2022-10-18 PROCEDURE — 74011250636 HC RX REV CODE- 250/636: Performed by: HOSPITALIST

## 2022-10-18 PROCEDURE — 80048 BASIC METABOLIC PNL TOTAL CA: CPT

## 2022-10-18 PROCEDURE — 74011250636 HC RX REV CODE- 250/636: Performed by: STUDENT IN AN ORGANIZED HEALTH CARE EDUCATION/TRAINING PROGRAM

## 2022-10-18 PROCEDURE — G0378 HOSPITAL OBSERVATION PER HR: HCPCS

## 2022-10-18 PROCEDURE — 74011000250 HC RX REV CODE- 250: Performed by: STUDENT IN AN ORGANIZED HEALTH CARE EDUCATION/TRAINING PROGRAM

## 2022-10-18 PROCEDURE — 65270000029 HC RM PRIVATE

## 2022-10-18 PROCEDURE — 74011000258 HC RX REV CODE- 258: Performed by: STUDENT IN AN ORGANIZED HEALTH CARE EDUCATION/TRAINING PROGRAM

## 2022-10-18 RX ADMIN — SODIUM CHLORIDE, PRESERVATIVE FREE 10 ML: 5 INJECTION INTRAVENOUS at 15:31

## 2022-10-18 RX ADMIN — PIPERACILLIN AND TAZOBACTAM 3.38 G: 3; .375 INJECTION, POWDER, FOR SOLUTION INTRAVENOUS at 18:05

## 2022-10-18 RX ADMIN — PIPERACILLIN AND TAZOBACTAM 3.38 G: 3; .375 INJECTION, POWDER, FOR SOLUTION INTRAVENOUS at 04:53

## 2022-10-18 RX ADMIN — SODIUM CHLORIDE, PRESERVATIVE FREE 10 ML: 5 INJECTION INTRAVENOUS at 05:14

## 2022-10-18 RX ADMIN — VANCOMYCIN HYDROCHLORIDE 1250 MG: 1.25 INJECTION, POWDER, LYOPHILIZED, FOR SOLUTION INTRAVENOUS at 11:19

## 2022-10-18 RX ADMIN — VANCOMYCIN HYDROCHLORIDE 1000 MG: 1 INJECTION, POWDER, LYOPHILIZED, FOR SOLUTION INTRAVENOUS at 04:53

## 2022-10-18 RX ADMIN — VANCOMYCIN HYDROCHLORIDE 1250 MG: 1.25 INJECTION, POWDER, LYOPHILIZED, FOR SOLUTION INTRAVENOUS at 23:54

## 2022-10-18 RX ADMIN — PIPERACILLIN AND TAZOBACTAM 3.38 G: 3; .375 INJECTION, POWDER, FOR SOLUTION INTRAVENOUS at 11:20

## 2022-10-18 NOTE — PROGRESS NOTES
Hospitalist Progress Note      Hospital summary: 19 y.o man who presented with right hand swelling and pain, found to have R index finger infection. He is s/p pyogenic tenosynovitis. Assessment/Plan:  Right index finger pyogenic tenosynovitis:  -s/p I&D 10/17  -wound cx w/ heavy staph  -continue IV vancomycin and Zosyn for now, de-escalate if cx remain unchanged tomorrow  -need to ensure he is able to perform wound care as he is uninsured    Code status: full  DVT prophylaxis: SCDs  Disposition: home hopefully tomorrow  ----------------------------------------------    CC: right hand pain    S: pain improved, no fever, n/v/d or dyspnea     Review of Systems:  Pertinent items are noted in HPI. O:  Visit Vitals  BP (!) 102/53   Pulse 64   Temp 98.2 °F (36.8 °C)   Resp 18   Ht 5' 6\" (1.676 m)   Wt 70 kg (154 lb 5.2 oz)   SpO2 97%   BMI 24.91 kg/m²       PHYSICAL EXAM:  Gen: NAD, non-toxic  HEENT: anicteric sclerae, normal conjunctiva  Neck: supple  Heart: RRR, no MRG, no JVD, no peripheral edema  Lungs: CTA b/l, non-labored respirations  Abd: soft, NT, ND, BS+  Extr: R hand wrapped  Skin: dry, no rash  Neuro: grossly non-focal  Psych: normal mood, appropriate affect      Intake/Output Summary (Last 24 hours) at 10/18/2022 1036  Last data filed at 10/18/2022 0918  Gross per 24 hour   Intake 980 ml   Output 5 ml   Net 975 ml        Recent labs & imaging reviewed:  Recent Results (from the past 24 hour(s))   CULTURE, WOUND W GRAM STAIN    Collection Time: 10/17/22 11:56 AM    Specimen: Abscess;  Wound   Result Value Ref Range    Special Requests: NO SPECIAL REQUESTS      GRAM STAIN RARE WBCS SEEN      GRAM STAIN NO ORGANISMS SEEN      Culture result: (A)       HEAVY STAPHYLOCOCCUS AUREUS FOR SENSITIVITIES REFER TO [id number]   CULTURE, [facility]OUND [facility] STAIN    Collection Time: 10/17/22  6:08 PM    Specimen: Finger   Result Value Ref Range    Special Requests: RIGHT INDEX FINGER     GRAM STAIN 1+ WBCS SEEN      GRAM STAIN MODERATE GRAM POSITIVE COCCI IN CLUSTERS      Culture result: PENDING    CBC WITH AUTOMATED DIFF    Collection Time: 10/18/22  3:50 AM   Result Value Ref Range    WBC 14.4 (H) 4.1 - 11.1 K/uL    RBC 4.14 4.10 - 5.70 M/uL    HGB 12.8 12.1 - 17.0 g/dL    HCT 36.7 36.6 - 50.3 %    MCV 88.6 80.0 - 99.0 FL    MCH 30.9 26.0 - 34.0 PG    MCHC 34.9 30.0 - 36.5 g/dL    RDW 11.8 11.5 - 14.5 %    PLATELET 634 726 - 523 K/uL    MPV 9.6 8.9 - 12.9 FL    NRBC 0.0 0  WBC    ABSOLUTE NRBC 0.00 0.00 - 0.01 K/uL    NEUTROPHILS 85 (H) 32 - 75 %    LYMPHOCYTES 12 12 - 49 %    MONOCYTES 3 (L) 5 - 13 %    EOSINOPHILS 0 0 - 7 %    BASOPHILS 0 0 - 1 %    IMMATURE GRANULOCYTES 0 0.0 - 0.5 %    ABS. NEUTROPHILS 12.2 (H) 1.8 - 8.0 K/UL    ABS. LYMPHOCYTES 1.7 0.8 - 3.5 K/UL    ABS. MONOCYTES 0.5 0.0 - 1.0 K/UL    ABS. EOSINOPHILS 0.0 0.0 - 0.4 K/UL    ABS. BASOPHILS 0.0 0.0 - 0.1 K/UL    ABS. IMM. GRANS. 0.0 0.00 - 0.04 K/UL    DF AUTOMATED     METABOLIC PANEL, BASIC    Collection Time: 10/18/22  3:50 AM   Result Value Ref Range    Sodium 137 136 - 145 mmol/L    Potassium 4.0 3.5 - 5.1 mmol/L    Chloride 105 97 - 108 mmol/L    CO2 23 21 - 32 mmol/L    Anion gap 9 5 - 15 mmol/L    Glucose 111 (H) 65 - 100 mg/dL    BUN 8 6 - 20 MG/DL    Creatinine 0.65 (L) 0.70 - 1.30 MG/DL    BUN/Creatinine ratio 12 12 - 20      eGFR >60 >60 ml/min/1.73m2    Calcium 8.9 8.5 - 10.1 MG/DL     Recent Labs     10/18/22  0350 10/17/22  0050   WBC 14.4* 15.5*   HGB 12.8 14.5   HCT 36.7 41.3    345     Recent Labs     10/18/22  0350 10/17/22  0050    136   K 4.0 4.2    103   CO2 23 28   BUN 8 17   CREA 0.65* 0.82   * 119*   CA 8.9 9.2     Recent Labs     10/17/22  0050   ALT 17   AP 61   TBILI 0.5   TP 7.8   ALB 3.8   GLOB 4.0     No results for input(s): INR, PTP, APTT, INREXT in the last 72 hours. No results for input(s): FE, TIBC, PSAT, FERR in the last 72 hours.    No results found for: FOL, RBCF   No results for input(s): PH, PCO2, PO2 in the last 72 hours. No results for input(s): CPK, CKNDX, TROIQ in the last 72 hours.     No lab exists for component: CPKMB  No results found for: CHOL, CHOLX, CHLST, CHOLV, HDL, HDLP, LDL, LDLC, DLDLP, TGLX, TRIGL, TRIGP, CHHD, CHHDX  No results found for: GLUCPOC  No results found for: COLOR, APPRN, SPGRU, REFSG, EDSON, PROTU, GLUCU, KETU, BILU, UROU, RENETTA, LEUKU, GLUKE, EPSU, BACTU, WBCU, RBCU, CASTS, UCRY    Med list reviewed  Current Facility-Administered Medications   Medication Dose Route Frequency    vancomycin (VANCOCIN) 1,250 mg in 0.9% sodium chloride 250 mL ([id number])  1,250 mg IntraVENous Q12H    sodium chloride (NS) flush 5-40 mL  5-40 mL IntraVENous Q8H    sodium chloride (NS) flush 5-40 mL  5-40 mL IntraVENous PRN    polyethylene glycol (MIRALAX) packet 17 g  17 g Oral DAILY PRN    ondansetron (ZOFRAN ODT) tablet 4 mg  4 mg Oral Q8H PRN    Or    ondansetron (ZOFRAN) injection 4 mg  4 mg IntraVENous Q6H PRN    0.9% sodium chloride infusion  125 mL/hr IntraVENous CONTINUOUS    Vancomycin Pharmacy Dosing   Other Rx Dosing/Monitoring    oxyCODONE IR (ROXICODONE) tablet 10 mg  10 mg Oral Q4H PRN    piperacillin-tazobactam (ZOSYN) 3.375 g in 0.9% sodium chloride (MBP/ADV) 100 mL MBP  3.375 g IntraVENous Q8H    acetaminophen (TYLENOL) tablet 650 mg  650 mg Oral Q6H PRN       Care Plan discussed with:  Patient/Family    Chasity Peña MD  Internal Medicine  Date of Service: 10/18/2022

## 2022-10-18 NOTE — DISCHARGE INSTRUCTIONS
Dr. Caren Keane' Post-Operative Instructions for Elbow/Wrist/Hand Surgery    Medications/Diet  Eat only light, non-greasy foods today  Take pain medicine with food  While taking pain medicines, you may not operate a vehicle, heavy machinery, or appliances  While taking pain medicines, you may not drink alcoholic beverages  While taking pain medicines, you may not make critical decisions or sign legal papers  If you have any reactions to your medicines, stop taking them and call my office immediately  Please keep in mind that constipation is a very common side   effect of taking narcotic pain medication. We recommend that patients take precautions to prevent constipation:  Drink plenty of water (6-8 glasses of 8 oz. a day)  Avoid alcohol, caffeine, and dairy products  Eat plenty of fiber (fruits, vegetables and whole grains)  Take an over the counter stool softener (colace or dulcolax)  Patients that have had upper extremity surgery should take frequent walks      Activity/Exercise    Exercises are not necessary at this stage, and you will be given exercises at your first post operative visit  You are in a splint and can continue to wear for comfort; okay to come out of and range fingers and wrist as much as tolerated. Please keep ice applied to the wrist for the first 72 hours or as long as pain or swelling persist. Do not apply ice directly to skin, or allow water to leak on your dressing    Dressings/Shower    Please keep dressing dry  If you have had arthroscopy, you may expect some drainage  Please reinforce your dressing with a dry sterile dressing  Your dressing will be removed at your first post operative visit  You may shower in Friday letting water run over finger, but do not soak or scrub. Emergency/Follow-Up    Please notify my office at [phone] if you develop any fever (101° or above), unexpected warmth, redness or swelling in your hand.  Please call if your fingers become cold, purple, numb, or there is excessive bleeding  Please call the office within 24 hours at [phone] (ext. [phone]) to schedule a follow up appointment next week  Please call the office before 3pm on Friday if you do not have enough pain medicine for the weekend. Narcotic pain medication cannot be called into your pharmacy and the prescription must be picked up at our office.

## 2022-10-18 NOTE — PROGRESS NOTES
Pharmacist Note - Vancomycin Dosing   Indication:  cellulitis/abscess of right index finger and palm   Current regimen:  1000 mg IV Q12H   Abx regimen:  vancomycin + Zosyn   ID Following ?: NO  Concomitant nephrotoxic drugs: None  Frequency of BMP?: daily through 10/19   Recent Labs     10/18/22  0350 10/17/22  0050   WBC 14.4* 15.5*   CREA 0.65* 0.82   BUN 8 17     Est CrCl: ? 100 ml/min; UO: not documented  Temp (24hrs), Av.6 °F (37 °C), Min:98.1 °F (36.7 °C), Max:99.1 °F (37.3 °C)    Cultures:   10/17 hand wound - heavy Staph aureus, preliminary   10/17 abscess - heavy Staph aureus, preliminary   10/17 right index finger wound - moderate GPC in clusters, preliminary     MRSA Swab ordered (if applicable)? N/A    Goal target range AUC/PAVEL 400-500    Recent level history:  Date/Time Dose & Interval Measured Level (mcg/mL) Associated AUC/PAVEL Dose Adjustment    10/18  1000 mg Q12H  379 (predicted)  1250 mg Q12H                                           Plan: Change dose to 1250 mg IV Q12H. Hopefully therapy can be changed to cefazolin tomorrow. If not - I will order a random level.

## 2022-10-18 NOTE — PROGRESS NOTES
Orthopedic Progress Note    S: Pain improved, no new complaints; endorses increased ability to move fingers; Denies numbness, tingling, focal weakness, other finger, hand or wrist pain, cp, sob, fever, chills    O: NAD, respirations unlabored; Dressings CDI, when removed there is blood soaked dressings, no vilma pus, edges approximated held loosely with suture; R index finger with SILT to the radial and ulnar sides of finger tip; there is ability to flex at MCP, and DIP slightly; tissues are less swollen, no extension of erythema or swelling to the hand. CR brisk. Patient Vitals for the past 4 hrs:   Temp Pulse BP   10/18/22 0810 98.2 °F (36.8 °C) 64 (!) 102/53     Recent Labs     10/18/22  0350 10/17/22  0050   HGB 12.8 14.5   HCT 36.7 41.3    345   BUN 8 17   CREA 0.65* 0.82   K 4.0 4.2    136       XR 2ND FINGER RT MIN 2 V  Narrative: EXAM: XR 2ND FINGER RT MIN 2 V    INDICATION: R second finger infection, r/o osteomyelitis. COMPARISON: None. FINDINGS: Three views of the right second finger demonstrate no fracture or  other acute osseous or articular abnormality. Soft tissue swelling of the second  digit is noted. Impression: Soft tissue swelling second digit without acute osseous abnormality. A/P:  Procedure: Procedure(s):  INCISION AND DRAINAGE RIGHT HAND  Post Op day: 1 Day Post-Op    - PT/OT - Can remain in splint today if providing comfort, but would encourage after today to come out and range fingers and wrist as much as tolerated and wear splint at night to protect. - Pain - APAP, Oxy PRN; Ice, Elevation, Ace wrap as needed  - Dressing - Change as needed for saturation; okay to leave new dressing placed today for 2 days then remove and shower letting water run over incision, no aggressive scrubbing or soaking.  Recover with dry sterile dressings until incisions are well healed and no longer draining  - Dispo - okay to discharge today if medicine agrees, transition to PO abx; needs f/u in 1 week with Dr. Josh Bolton, will need to call for appointment.      JOSY Petty  Orthopedic Trauma Service  [address]

## 2022-10-19 VITALS
SYSTOLIC BLOOD PRESSURE: 120 MMHG | OXYGEN SATURATION: 98 % | BODY MASS INDEX: 24.8 KG/M2 | WEIGHT: 154.32 LBS | TEMPERATURE: 98.4 F | DIASTOLIC BLOOD PRESSURE: 69 MMHG | RESPIRATION RATE: 16 BRPM | HEART RATE: 66 BPM | HEIGHT: 66 IN

## 2022-10-19 LAB
ANION GAP SERPL CALC-SCNC: 6 MMOL/L (ref 5–15)
BACTERIA SPEC CULT: ABNORMAL
BACTERIA SPEC CULT: NORMAL
BUN SERPL-MCNC: 11 MG/DL (ref 6–20)
BUN/CREAT SERPL: 16 (ref 12–20)
CALCIUM SERPL-MCNC: 8.5 MG/DL (ref 8.5–10.1)
CHLORIDE SERPL-SCNC: 109 MMOL/L (ref 97–108)
CO2 SERPL-SCNC: 24 MMOL/L (ref 21–32)
CREAT SERPL-MCNC: 0.67 MG/DL (ref 0.7–1.3)
ERYTHROCYTE [DISTWIDTH] IN BLOOD BY AUTOMATED COUNT: 11.9 % (ref 11.5–14.5)
GLUCOSE SERPL-MCNC: 95 MG/DL (ref 65–100)
GRAM STN SPEC: ABNORMAL
HCT VFR BLD AUTO: 37 % (ref 36.6–50.3)
HGB BLD-MCNC: 12.4 G/DL (ref 12.1–17)
MCH RBC QN AUTO: 30.6 PG (ref 26–34)
MCHC RBC AUTO-ENTMCNC: 33.5 G/DL (ref 30–36.5)
MCV RBC AUTO: 91.4 FL (ref 80–99)
NRBC # BLD: 0 K/UL (ref 0–0.01)
NRBC BLD-RTO: 0 PER 100 WBC
PLATELET # BLD AUTO: 317 K/UL (ref 150–400)
PMV BLD AUTO: 9.5 FL (ref 8.9–12.9)
POTASSIUM SERPL-SCNC: 4.1 MMOL/L (ref 3.5–5.1)
RBC # BLD AUTO: 4.05 M/UL (ref 4.1–5.7)
SERVICE CMNT-IMP: ABNORMAL
SERVICE CMNT-IMP: NORMAL
SODIUM SERPL-SCNC: 139 MMOL/L (ref 136–145)
WBC # BLD AUTO: 8.1 K/UL (ref 4.1–11.1)

## 2022-10-19 PROCEDURE — 74011250637 HC RX REV CODE- 250/637: Performed by: HOSPITALIST

## 2022-10-19 PROCEDURE — 74011250636 HC RX REV CODE- 250/636: Performed by: HOSPITALIST

## 2022-10-19 PROCEDURE — 74011250636 HC RX REV CODE- 250/636: Performed by: STUDENT IN AN ORGANIZED HEALTH CARE EDUCATION/TRAINING PROGRAM

## 2022-10-19 PROCEDURE — 36415 COLL VENOUS BLD VENIPUNCTURE: CPT

## 2022-10-19 PROCEDURE — 74011000250 HC RX REV CODE- 250: Performed by: STUDENT IN AN ORGANIZED HEALTH CARE EDUCATION/TRAINING PROGRAM

## 2022-10-19 PROCEDURE — 74011000258 HC RX REV CODE- 258: Performed by: STUDENT IN AN ORGANIZED HEALTH CARE EDUCATION/TRAINING PROGRAM

## 2022-10-19 PROCEDURE — 85027 COMPLETE CBC AUTOMATED: CPT

## 2022-10-19 PROCEDURE — 80048 BASIC METABOLIC PNL TOTAL CA: CPT

## 2022-10-19 RX ORDER — SULFAMETHOXAZOLE AND TRIMETHOPRIM 800; 160 MG/1; MG/1
1 TABLET ORAL EVERY 12 HOURS
Status: DISCONTINUED | OUTPATIENT
Start: 2022-10-19 | End: 2022-10-19 | Stop reason: HOSPADM

## 2022-10-19 RX ORDER — OXYCODONE HYDROCHLORIDE 10 MG/1
10 TABLET ORAL
Qty: 15 TABLET | Refills: 0 | Status: SHIPPED | OUTPATIENT
Start: 2022-10-19 | End: 2022-10-22

## 2022-10-19 RX ORDER — SULFAMETHOXAZOLE AND TRIMETHOPRIM 800; 160 MG/1; MG/1
1 TABLET ORAL EVERY 12 HOURS
Qty: 13 TABLET | Refills: 0 | Status: SHIPPED | OUTPATIENT
Start: 2022-10-19 | End: 2022-10-26

## 2022-10-19 RX ADMIN — SULFAMETHOXAZOLE AND TRIMETHOPRIM 1 TABLET: 800; 160 TABLET ORAL at 14:33

## 2022-10-19 RX ADMIN — SODIUM CHLORIDE, PRESERVATIVE FREE 10 ML: 5 INJECTION INTRAVENOUS at 14:33

## 2022-10-19 RX ADMIN — PIPERACILLIN AND TAZOBACTAM 3.38 G: 3; .375 INJECTION, POWDER, FOR SOLUTION INTRAVENOUS at 02:45

## 2022-10-19 RX ADMIN — VANCOMYCIN HYDROCHLORIDE 1250 MG: 1.25 INJECTION, POWDER, LYOPHILIZED, FOR SOLUTION INTRAVENOUS at 12:12

## 2022-10-19 NOTE — PROGRESS NOTES
I have reviewed discharge instructions with the patient and guardian. Patient also needed some supplies for wound care which were given for couple of days. The patient and guardian verbalized understanding.

## 2022-10-19 NOTE — PROGRESS NOTES
Communication Note    Patient speaks Qatari as their preferred language for their healthcare communication, please reach out to Language Services for  services at:     Senior Christina  - Navigator - [phone]    Email: [email or web address]  General phone: [phone] ([phone])    Our interpreters are available for team members working with limited English proficient (LEP) patients remotely, in person as well as phone or video interpreters on the OVGuide. For the LATEST Language services updates/ resources please see our Language Services page on:[facility] under the Clinical Resources tab. Please always document the use of  services (name and/or number of ) in your clinical notes.

## 2022-10-19 NOTE — PROGRESS NOTES
RAF: anticipate d/c home with new PCP & OP follow up with Orthopedic surgery    CM specialist scheduled follow up PCP appt with  Missy Bonilla for Wednesday, November 2nd, 2022 at 12:30 p.m. Pt is uninsured, pt intends to apply for a [facility]ian, first source screening completed    Family transport    RUR: 5%  S/p I&D of right hand 10/17,   Right index finger pyogenic tenosynovitis  Cultures pending    Reason for Admission:    Right index finger pyogenic tenosynovitis                   RUR Score:                   5%  Plan for utilizing home health:   Pt is uninsured and as such cannot qualify for [facility] services      PCP: First and Last name:  None     Name of Practice:    Are you a current patient: Yes/No: NO    Approximate date of last visit: N/A   Can you participate in a virtual visit with your PCP:                     Current Advanced Directive/Advance Care Plan: Full Code      Healthcare Decision Maker:   Click here to complete [address] including selection of the [address] Relationship (ie \"Primary\")           Gideon Ye, [phone]                  Transition of Care Plan:                    [facility]-CM reviewed pt chart & and met with pt at bedside to discuss transitional planning with cell phone interpretation by [facility] [phone]. Pt stated he recently emigrated here from [place] and lives with a work friend in [place], [place]. Pt is independent with adls/iadls. Denies Dme. Pt stated he is uninsured and does not have a PCP. CM confirmed demographics. CM discussed established a new PCP with [facility] as well as services, OP wound care clinic at [facility]. Pt is agreeable to this. CM Specialist arranged new PCP as noted on AVS and CM spoke with Diandra Campo of [address] [place] [address] wound clinic and he advised they can accept and will need clinicals/wound care order faxed to  [phone].  However, CM also perfect served Ortho Surgery and they advised pt can do own dressings. CM informed Attending of this as well and he is in agreement. CM notified Nurse of plan and she intends to provide pt with dressing supplies for d/c. CM had [facility] referral and it was denied. Dalton Lyman RN BSN CCM  Care Management Interventions  PCP Verified by CM: Yes  Palliative Care Criteria Met (RRAT>21 & CHF Dx)?: No  Mode of Transport at Discharge:  Other (see comment) (family)  Transition of Care Consult (CM Consult): Discharge Planning  MyChart Signup: No  Discharge Durable Medical Equipment: No  Health Maintenance Reviewed: Yes  Physical Therapy Consult: No  Occupational Therapy Consult: No  Support Systems: Other Family Member(s)  Confirm Follow Up Transport: Family  Discharge Location  Patient Expects to be Discharged to[de-identified] Home with outpatient services

## 2022-10-19 NOTE — PROGRESS NOTES
Provided  services remotely to [facility] during patient assessment.               Sderick Luke, [address] Senior  - Navigator  [phone]

## 2022-10-19 NOTE — PROGRESS NOTES
1020-SIMEON spoke with [employer], [place], [phone], and she will contact CM back in 20 minutes after she completes her other phone call. CM to follow.   Jourdan Trivedi RN BSN CCM

## 2022-10-19 NOTE — PROGRESS NOTES
Hospital follow-up PCP transitional care appointment has been scheduled with Physician at [facility] for Wednesday, November 2nd, 2022 at 12:30 p.m. Please provide photo ID, proof of income - recent eight weeks of paystubs, listing of all medications and discharge papers. This was earliest appointment available. Pending patient discharge.  Fernando General, Care Management Assistant

## 2022-10-19 NOTE — PROGRESS NOTES
Ortho Daily Progress Note      Patient: Nuzhat Morocho                   MRN: [id number]  Sex: male  YOB: 2000           Age: 19 y.o.    2 Days Post-Op    Procedure(s):  INCISION AND DRAINAGE RIGHT HAND      Subjective: Right hand feels better, anxious to go home     Visit Vitals  /69 (BP 1 Location: Left upper arm, BP Patient Position: At rest)   Pulse 66   Temp 98.4 °F (36.9 °C)   Resp 16   Ht 5' 6\" (1.676 m)   Wt 70 kg (154 lb 5.2 oz)   SpO2 98%   BMI 24.91 kg/m²        Lab Results:  HGB   Date/Time Value Ref Range Status   10/19/2022 02:40 AM 12.4 12.1 - 17.0 g/dL Final       Physical Exam:    21yo male, healthy appearing, NAD, bulky dressing/splint right wrist, dressing removed, incision intact, no active drainage, finger swelling improving, brisk capillary refill in right index, sensation intact to light touch    Plan:    Discussed with surgeon, Dr. Rajendra He. Dressing changed. Continue ice/elevation, ABX per primary team. Instructed patient to change dressing daily. May wash incision with soap/water beginning 10/21/22. Do not submerge incision until after office follow-up. Sutures need to be removed 10-14 days post-op. Follow-up with Dr. Lamar Cruz.     [address] [place] [address]  10/19/2022   10:40 AM      .

## 2022-10-19 NOTE — DISCHARGE SUMMARY
Discharge Summary     Patient: Magdalena Carlson MRN: [id number]  SSN: [id number]    YOB: 2000  Age: 19 y.o. Sex: male       Admit Date: 10/17/2022    Discharge Date: 10/19/2022      Admission Diagnoses: Cellulitis of finger of right hand [F14.907]  Tenosynovitis [M65.9]    Discharge Diagnoses:   Problem List as of 10/19/2022 Date Reviewed: 12/17/2015            Codes Class Noted - Resolved    Tenosynovitis ICD-10-CM: M65.9  ICD-9-CM: 727.00  10/18/2022 - Present        Cellulitis of finger of right hand ICD-10-CM: L03.011  ICD-9-CM: 681.00  10/17/2022 - Present            Discharge Condition: Stable    Hospital Course: 19 y.o man who presented with right hand swelling and pain, found to have R index finger infection. He is s/p pyogenic tenosynovitis I&D. Right index finger pyogenic tenosynovitis:  -s/p I&D 10/17  -wound cx w/ heavy staph - speciation pending.   -was on IV vancomycin and Zosyn, changed to oral Bactrim on discharge to cover MRSA empirically  -PCP set up at [facility], and wound care clinic follow-up    Consults: Orthopedics    Significant Diagnostic Studies: see above  XR 2ND FINGER RT MIN 2 V    Result Date: 10/17/2022  Soft tissue swelling second digit without acute osseous abnormality. Disposition: home    S: pain controlled, no n/v/d or dyspnea    Visit Vitals  /69 (BP 1 Location: Left upper arm, BP Patient Position: At rest)   Pulse 66   Temp 98.4 °F (36.9 °C)   Resp 16   Ht 5' 6\" (1.676 m)   Wt 70 kg (154 lb 5.2 oz)   SpO2 98%   BMI 24.91 kg/m²     NAD  RRR  Lungs CTAB  R hand wrapped    Discharge Medications:   Current Discharge Medication List        START taking these medications    Details   oxyCODONE IR (ROXICODONE) 10 mg tab immediate release tablet Take 1 Tablet by mouth every six (6) hours as needed for Pain for up to 3 days.  Max Daily Amount: 40 mg.  Qty: 15 Tablet, Refills: 0    Associated Diagnoses: Tenosynovitis trimethoprim-sulfamethoxazole (BACTRIM DS, SEPTRA DS) 160-800 mg per tablet Take 1 Tablet by mouth every twelve (12) hours for 13 doses. Qty: 13 Tablet, Refills: 0           Follow-up Information       Follow up With Specialties Details Why Contact Info    Miguel Fuentes.  Follow up Hospital follow up scheduled for Wednesday, November 2nd 2022 at 12:30p.m [id number]    Delores Hopson MD Orthopedic Surgery Schedule an appointment as soon as possible for a visit For wound re-check [address] [facility] [address] [phone]                Signed By: Lety Slater MD     October 19, 2022      Greater than 30 minutes spent on discharge management.

## 2023-05-27 ENCOUNTER — APPOINTMENT (OUTPATIENT)
Facility: HOSPITAL | Age: 23
End: 2023-05-27

## 2023-05-27 ENCOUNTER — HOSPITAL ENCOUNTER (EMERGENCY)
Facility: HOSPITAL | Age: 23
Discharge: HOME | End: 2023-05-27
Attending: STUDENT IN AN ORGANIZED HEALTH CARE EDUCATION/TRAINING PROGRAM

## 2023-05-27 VITALS
WEIGHT: 147.71 LBS | SYSTOLIC BLOOD PRESSURE: 128 MMHG | BODY MASS INDEX: 23.74 KG/M2 | HEART RATE: 68 BPM | TEMPERATURE: 98 F | DIASTOLIC BLOOD PRESSURE: 72 MMHG | RESPIRATION RATE: 16 BRPM | HEIGHT: 66 IN | OXYGEN SATURATION: 97 %

## 2023-05-27 DIAGNOSIS — K04.7 DENTAL ABSCESS: Primary | ICD-10-CM

## 2023-05-27 DIAGNOSIS — R22.0 FACIAL SWELLING: ICD-10-CM

## 2023-05-27 DIAGNOSIS — K08.89 PAIN, DENTAL: ICD-10-CM

## 2023-05-27 LAB
ANION GAP SERPL CALC-SCNC: 4 MMOL/L (ref 5–15)
BASOPHILS # BLD: 0.1 K/UL (ref 0–0.1)
BASOPHILS NFR BLD: 0 % (ref 0–1)
BUN SERPL-MCNC: 8 MG/DL (ref 6–20)
BUN/CREAT SERPL: 9 (ref 12–20)
CALCIUM SERPL-MCNC: 9.3 MG/DL (ref 8.5–10.1)
CHLORIDE SERPL-SCNC: 107 MMOL/L (ref 97–108)
CO2 SERPL-SCNC: 29 MMOL/L (ref 21–32)
COMMENT:: NORMAL
CREAT SERPL-MCNC: 0.86 MG/DL (ref 0.7–1.3)
DIFFERENTIAL METHOD BLD: ABNORMAL
EOSINOPHIL # BLD: 0.6 K/UL (ref 0–0.4)
EOSINOPHIL NFR BLD: 5 % (ref 0–7)
ERYTHROCYTE [DISTWIDTH] IN BLOOD BY AUTOMATED COUNT: 13.2 % (ref 11.5–14.5)
GLUCOSE SERPL-MCNC: 94 MG/DL (ref 65–100)
HCT VFR BLD AUTO: 43.2 % (ref 36.6–50.3)
HGB BLD-MCNC: 14.4 G/DL (ref 12.1–17)
IMM GRANULOCYTES # BLD AUTO: 0 K/UL (ref 0–0.04)
IMM GRANULOCYTES NFR BLD AUTO: 0 % (ref 0–0.5)
LYMPHOCYTES # BLD: 2.6 K/UL (ref 0.8–3.5)
LYMPHOCYTES NFR BLD: 20 % (ref 12–49)
MCH RBC QN AUTO: 30.9 PG (ref 26–34)
MCHC RBC AUTO-ENTMCNC: 33.3 G/DL (ref 30–36.5)
MCV RBC AUTO: 92.7 FL (ref 80–99)
MONOCYTES # BLD: 0.9 K/UL (ref 0–1)
MONOCYTES NFR BLD: 6 % (ref 5–13)
NEUTS SEG # BLD: 9.2 K/UL (ref 1.8–8)
NEUTS SEG NFR BLD: 69 % (ref 32–75)
NRBC # BLD: 0 K/UL (ref 0–0.01)
NRBC BLD-RTO: 0 PER 100 WBC
PLATELET # BLD AUTO: 271 K/UL (ref 150–400)
PMV BLD AUTO: 9.6 FL (ref 8.9–12.9)
POTASSIUM SERPL-SCNC: 3.5 MMOL/L (ref 3.5–5.1)
RBC # BLD AUTO: 4.66 M/UL (ref 4.1–5.7)
SODIUM SERPL-SCNC: 140 MMOL/L (ref 136–145)
SPECIMEN HOLD: NORMAL
WBC # BLD AUTO: 13.4 K/UL (ref 4.1–11.1)

## 2023-05-27 PROCEDURE — 80048 BASIC METABOLIC PNL TOTAL CA: CPT

## 2023-05-27 PROCEDURE — 36415 COLL VENOUS BLD VENIPUNCTURE: CPT

## 2023-05-27 PROCEDURE — 96374 THER/PROPH/DIAG INJ IV PUSH: CPT

## 2023-05-27 PROCEDURE — 2580000003 HC RX 258

## 2023-05-27 PROCEDURE — 6370000000 HC RX 637 (ALT 250 FOR IP)

## 2023-05-27 PROCEDURE — 99285 EMERGENCY DEPT VISIT HI MDM: CPT

## 2023-05-27 PROCEDURE — 6360000004 HC RX CONTRAST MEDICATION: Performed by: STUDENT IN AN ORGANIZED HEALTH CARE EDUCATION/TRAINING PROGRAM

## 2023-05-27 PROCEDURE — 41800 DRAINAGE OF GUM LESION: CPT

## 2023-05-27 PROCEDURE — 96375 TX/PRO/DX INJ NEW DRUG ADDON: CPT

## 2023-05-27 PROCEDURE — 2500000003 HC RX 250 WO HCPCS

## 2023-05-27 PROCEDURE — 6360000002 HC RX W HCPCS

## 2023-05-27 PROCEDURE — 96361 HYDRATE IV INFUSION ADD-ON: CPT

## 2023-05-27 PROCEDURE — 70487 CT MAXILLOFACIAL W/DYE: CPT

## 2023-05-27 PROCEDURE — 85025 COMPLETE CBC W/AUTO DIFF WBC: CPT

## 2023-05-27 RX ORDER — OXYCODONE HYDROCHLORIDE AND ACETAMINOPHEN 5; 325 MG/1; MG/1
1 TABLET ORAL
Status: COMPLETED | OUTPATIENT
Start: 2023-05-27 | End: 2023-05-27

## 2023-05-27 RX ORDER — KETOROLAC TROMETHAMINE 30 MG/ML
30 INJECTION, SOLUTION INTRAMUSCULAR; INTRAVENOUS
Status: COMPLETED | OUTPATIENT
Start: 2023-05-27 | End: 2023-05-27

## 2023-05-27 RX ORDER — CLINDAMYCIN HYDROCHLORIDE 300 MG/1
300 CAPSULE ORAL 4 TIMES DAILY
Qty: 28 CAPSULE | Refills: 0 | Status: SHIPPED | OUTPATIENT
Start: 2023-05-27 | End: 2023-06-03

## 2023-05-27 RX ORDER — OXYCODONE HYDROCHLORIDE AND ACETAMINOPHEN 5; 325 MG/1; MG/1
1 TABLET ORAL EVERY 6 HOURS PRN
Qty: 12 TABLET | Refills: 0 | Status: SHIPPED | OUTPATIENT
Start: 2023-05-27 | End: 2023-05-30

## 2023-05-27 RX ORDER — LIDOCAINE HYDROCHLORIDE 10 MG/ML
5 INJECTION, SOLUTION EPIDURAL; INFILTRATION; INTRACAUDAL; PERINEURAL
Status: COMPLETED | OUTPATIENT
Start: 2023-05-27 | End: 2023-05-27

## 2023-05-27 RX ORDER — CLINDAMYCIN PHOSPHATE 600 MG/50ML
600 INJECTION, SOLUTION INTRAVENOUS
Status: COMPLETED | OUTPATIENT
Start: 2023-05-27 | End: 2023-05-27

## 2023-05-27 RX ORDER — CHLORHEXIDINE GLUCONATE 0.12 MG/ML
15 RINSE ORAL 2 TIMES DAILY
Qty: 420 ML | Refills: 0 | Status: SHIPPED | OUTPATIENT
Start: 2023-05-27 | End: 2023-06-10

## 2023-05-27 RX ORDER — 0.9 % SODIUM CHLORIDE 0.9 %
1000 INTRAVENOUS SOLUTION INTRAVENOUS ONCE
Status: COMPLETED | OUTPATIENT
Start: 2023-05-27 | End: 2023-05-27

## 2023-05-27 RX ADMIN — OXYCODONE HYDROCHLORIDE AND ACETAMINOPHEN 1 TABLET: 5; 325 TABLET ORAL at 09:04

## 2023-05-27 RX ADMIN — IOPAMIDOL 100 ML: 612 INJECTION, SOLUTION INTRAVENOUS at 09:53

## 2023-05-27 RX ADMIN — SODIUM CHLORIDE 1000 ML: 9 INJECTION, SOLUTION INTRAVENOUS at 09:01

## 2023-05-27 RX ADMIN — CLINDAMYCIN PHOSPHATE 600 MG: 600 INJECTION, SOLUTION INTRAVENOUS at 09:54

## 2023-05-27 RX ADMIN — KETOROLAC TROMETHAMINE 30 MG: 30 INJECTION, SOLUTION INTRAMUSCULAR; INTRAVENOUS at 09:02

## 2023-05-27 RX ADMIN — LIDOCAINE HYDROCHLORIDE 5 ML: 10 INJECTION, SOLUTION EPIDURAL; INFILTRATION; INTRACAUDAL; PERINEURAL at 09:02

## 2023-05-27 ASSESSMENT — ENCOUNTER SYMPTOMS
NAUSEA: 0
SHORTNESS OF BREATH: 0
VOMITING: 0
WHEEZING: 0
FACIAL SWELLING: 1
RHINORRHEA: 0
SORE THROAT: 0
DIARRHEA: 0
EYE PAIN: 0
ABDOMINAL PAIN: 0
BACK PAIN: 0
COUGH: 0
CHEST TIGHTNESS: 0
TROUBLE SWALLOWING: 0

## 2023-05-27 ASSESSMENT — PAIN SCALES - GENERAL
PAINLEVEL_OUTOF10: 10
PAINLEVEL_OUTOF10: 0

## 2023-05-27 ASSESSMENT — PAIN DESCRIPTION - PAIN TYPE: TYPE: ACUTE PAIN

## 2023-05-27 ASSESSMENT — PAIN DESCRIPTION - LOCATION: LOCATION: MOUTH

## 2023-05-27 NOTE — ED PROVIDER NOTES
[facility] EMERGENCY DEP  EMERGENCY DEPARTMENT ENCOUNTER      Pt Name: Akil Bingham  MRN: [id number]  Danielgfmichelle 2000  Date of evaluation: 5/27/2023  Provider: Myriam Sandhu PA-C    CHIEF COMPLAINT       Chief Complaint   Patient presents with    Facial Swelling    Dental Pain         HISTORY OF PRESENT ILLNESS   (Location/Symptom, Timing/Onset, Context/Setting, Quality, Duration, Modifying Factors, Severity)  Note limiting factors. 15yo male presents with 2 days of facial swelling and dental pain. Patient reports waking up yesterday in extreme pain and had swelling inside his mouth near his upper lip as well as some swelling spreading into his cheeks. Pain is described as severe and constant. No exacerbating or alleviating factors. He found some amoxicillin at home and took one dose last night. Reports he has not seen a dentist in a long time. Denies fever, pain with eye movements, or difficulty swallowing. Review of External Medical Records:     Nursing Notes were reviewed. REVIEW OF SYSTEMS    (2-9 systems for level 4, 10 or more for level 5)     Review of Systems   Constitutional:  Negative for appetite change, chills and fever. HENT:  Positive for dental problem and facial swelling. Negative for congestion, ear pain, mouth sores, rhinorrhea, sore throat and trouble swallowing. Eyes:  Negative for pain. Respiratory:  Negative for cough, chest tightness, shortness of breath and wheezing. Cardiovascular:  Negative for chest pain and palpitations. Gastrointestinal:  Negative for abdominal pain, diarrhea, nausea and vomiting. Genitourinary:  Negative for decreased urine volume, difficulty urinating, frequency, hematuria and urgency. Musculoskeletal:  Negative for back pain. Skin:  Negative for pallor and rash. Neurological:  Negative for dizziness, weakness, light-headedness, numbness and headaches. All other systems reviewed and are negative.     Except as noted

## 2023-05-27 NOTE — ED TRIAGE NOTES
Triage: Pt arrives ambulatory from home with CC of dental pain and facial swelling. He has been taking amoxicillin that he found at home.

## 2023-05-27 NOTE — ED NOTES
8:21 AM  I have evaluated the patient as the Provider in Triage. I have reviewed his vital signs and the triage nurse assessment. I have talked with the patient and any available family and advised that I am the provider in triage and have ordered the appropriate study to initiate their work up based on the clinical presentation during my assessment. I have advised that the patient will be accommodated in the Main ED as soon as possible. I have also requested to contact the triage nurse or myself immediately if the patient experiences any changes in their condition during this brief waiting period. Dental pain x3 days with lip swelling, taking amoxicillin at home.  Dental abscess adjacent to maxillary right central incisor noted on exam.  Pollo Vaughn, [address], [place]  05/27/23 1412

## (undated) DEVICE — DRESSING,GAUZE,XEROFORM,CURAD,1"X8",ST: Brand: CURAD

## (undated) DEVICE — SOLUTION IRRIG 1000ML 0.9% SOD CHL USP POUR PLAS BTL

## (undated) DEVICE — SUTURE NONABSORBABLE MONOFILAMENT 4-0 PS-2 18 IN BLU PROLENE 8682H

## (undated) DEVICE — MINOR BASIN -SMH: Brand: MEDLINE INDUSTRIES, INC.

## (undated) DEVICE — GARMENT,MEDLINE,DVT,INT,CALF,MED, GEN2: Brand: MEDLINE

## (undated) DEVICE — DRAPE,HAND,STERILE: Brand: MEDLINE

## (undated) DEVICE — SPONGE GZ W4XL4IN COT 12 PLY TYP VII WVN C FLD DSGN

## (undated) DEVICE — PREP SKN CHLRAPRP APL 26ML STR --

## (undated) DEVICE — DRAIN SURG PENROSE 0.25X18 IN CLOSED WND DRAINAGE PREM SIL

## (undated) DEVICE — SYR 10ML LUER LOK 1/5ML GRAD --

## (undated) DEVICE — HANDLE LT SNAP ON ULT DURABLE LENS FOR TRUMPF ALC DISPOSABLE